# Patient Record
Sex: FEMALE | Race: WHITE | NOT HISPANIC OR LATINO | Employment: FULL TIME | URBAN - METROPOLITAN AREA
[De-identification: names, ages, dates, MRNs, and addresses within clinical notes are randomized per-mention and may not be internally consistent; named-entity substitution may affect disease eponyms.]

---
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
## 2018-02-01 ENCOUNTER — OFFICE VISIT (OUTPATIENT)
Dept: OBGYN | Facility: CLINIC | Age: 18
End: 2018-02-01
Payer: COMMERCIAL

## 2018-02-01 VITALS
HEIGHT: 64 IN | WEIGHT: 117 LBS | BODY MASS INDEX: 19.97 KG/M2 | SYSTOLIC BLOOD PRESSURE: 100 MMHG | DIASTOLIC BLOOD PRESSURE: 62 MMHG

## 2018-02-01 DIAGNOSIS — N39.0 RECURRENT UTI: ICD-10-CM

## 2018-02-01 DIAGNOSIS — R30.0 DYSURIA: Primary | ICD-10-CM

## 2018-02-01 DIAGNOSIS — K62.5 FRESH BLOOD PASSED PER RECTUM: ICD-10-CM

## 2018-02-01 LAB
ALBUMIN UR-MCNC: NEGATIVE MG/DL
APPEARANCE UR: CLEAR
BILIRUB UR QL STRIP: NEGATIVE
COLOR UR AUTO: YELLOW
GLUCOSE UR STRIP-MCNC: NEGATIVE MG/DL
HGB UR QL STRIP: ABNORMAL
KETONES UR STRIP-MCNC: NEGATIVE MG/DL
LEUKOCYTE ESTERASE UR QL STRIP: ABNORMAL
NITRATE UR QL: NEGATIVE
PH UR STRIP: 5.5 PH (ref 5–7)
SOURCE: ABNORMAL
SP GR UR STRIP: >1.03 (ref 1–1.03)
UROBILINOGEN UR STRIP-ACNC: 0.2 EU/DL (ref 0.2–1)

## 2018-02-01 PROCEDURE — 81003 URINALYSIS AUTO W/O SCOPE: CPT | Performed by: NURSE PRACTITIONER

## 2018-02-01 PROCEDURE — 99213 OFFICE O/P EST LOW 20 MIN: CPT | Performed by: NURSE PRACTITIONER

## 2018-02-01 PROCEDURE — 87086 URINE CULTURE/COLONY COUNT: CPT | Performed by: NURSE PRACTITIONER

## 2018-02-01 RX ORDER — NITROFURANTOIN 25; 75 MG/1; MG/1
100 CAPSULE ORAL 2 TIMES DAILY
Qty: 14 CAPSULE | Refills: 0 | Status: SHIPPED | OUTPATIENT
Start: 2018-02-01 | End: 2018-04-27

## 2018-02-01 NOTE — PROGRESS NOTES
SUBJECTIVE:                                                   Mina Chavarria is a 17 year old female who presents to clinic today for the following health issue(s):  Patient presents with:  Urinary Problem: symptoms of urgency, frequency and pain x 1 week, started cranberry pills helped initally, has previous h/o UTI's.    Additional information: has noticed blood in stool, thinks she may have a hemorrhoid.        HPI:  Pt here today with concerns about a possible UTI. She has some urgency and frequency and some pain x1 week. She takes Azo cranberry tablets when she feels symptoms starting.     She also has concerns about some rectal bleeding. She hasn't noticed blood in the stool, but pain with BM and some blood on the tissue after wiping. The blood is bright red. She denies constipation, she moves her bowels daily. She states she does not strain with BM. No anal sexual activity.    No LMP recorded. Patient is not currently having periods (Reason: IUD)..   Patient is sexually active, .  Using IUD for contraception.    reports that she has been smoking.  She has never used smokeless tobacco.  STD testing offered?  recently tested 17  Health maintenance updated:  yes    Today's PHQ-2 Score:   PHQ-2 (  [employer]) 3/20/2017   Q1: Little interest or pleasure in doing things 0   Q2: Feeling down, depressed or hopeless 0   PHQ-2 Score 0     Today's PHQ-9 Score:   PHQ-9 SCORE 2017   Total Score -   Total Score 2     Today's JAMILA-7 Score:   JAMILA-7 SCORE 2017   Total Score -   Total Score 0       Problem list and histories reviewed & adjusted, as indicated.  Additional history: as documented.    Patient Active Problem List   Diagnosis     Herpes simplex     Major depressive disorder, recurrent episode, severe (H)     Anxiety disorder     IUD check up     Constipation     Recurrent UTI     Attention deficit disorder (ADD) without hyperactivity     Past Surgical History:   Procedure Laterality Date      "HC EXC BENIGN SKIN LESION FACE/EARS <=0.5 CM  infant      Social History   Substance Use Topics     Smoking status: Current Some Day Smoker     Smokeless tobacco: Never Used      Comment: 3 cigarettes per week/trying to quit. Has been about 2 weeks since last cigarette.     Alcohol use 0.0 oz/week     0 Standard drinks or equivalent per week      Comment:  6/27/12   8/13/12       Problem (# of Occurrences) Relation (Name,Age of Onset)    CANCER (1) Maternal Grandmother: rectal cancer    Gynecology (1) Mother: endometrial hyperplasia, s/p hyterectomy            Current Outpatient Prescriptions   Medication Sig     nitroFURantoin, macrocrystal-monohydrate, (MACROBID) 100 MG capsule Take 1 capsule (100 mg) by mouth 2 times daily     valACYclovir (VALTREX) 500 MG tablet Take 1 tablet (500 mg) by mouth daily . Double up during an outbreak     gabapentin (NEURONTIN) 300 MG capsule Take 300 mg by mouth     buPROPion (WELLBUTRIN XL) 300 MG 24 hr tablet Take 1 tablet (300 mg) by mouth every morning     citalopram (CELEXA) 40 MG tablet Take 1 tablet (40 mg) by mouth daily     levonorgestrel (MIRENA) 20 MCG/24HR IUD 1 each (20 mcg) by Intrauterine route once for 1 dose     No current facility-administered medications for this visit.      Allergies   Allergen Reactions     Hydrocodone Nausea and Vomiting     Oxycodone Nausea and Vomiting       ROS:  12 point review of systems negative other than symptoms noted below.  Respiratory: Cough  Gastrointestinal: Blood in Stools  Genitourinary: Painful Urination and Urgency    OBJECTIVE:     /62  Ht 5' 3.5\" (1.613 m)  Wt 117 lb (53.1 kg)  BMI 20.4 kg/m2  Body mass index is 20.4 kg/(m^2).    Exam:  Constitutional:  Appearance: Well nourished, well developed alert, in no acute distress  Neurologic/Psychiatric:  Mental Status:  Oriented X3   Pelvic Exam:  External Genitalia:     Normal appearance for age, no discharge present, no tenderness present, no inflammatory lesions " present, color normal  Anus:     Anus within normal limits, no hemorrhoids present  Rectal:    No blood noticed on rectal exam. Pt noted some pain  Inguinal Lymph Nodes:     No lymphadenopathy present  Pubic Hair:     Normal pubic hair distribution for age  Genitalia and Groin:     No rashes present, no lesions present, no areas of discoloration, no masses present       In-Clinic Test Results:  Results for orders placed or performed in visit on 02/01/18 (from the past 24 hour(s))   UA without Microscopic   Result Value Ref Range    Color Urine Yellow     Appearance Urine Clear     Glucose Urine Negative NEG^Negative mg/dL    Bilirubin Urine Negative NEG^Negative    Ketones Urine Negative NEG^Negative mg/dL    Specific Gravity Urine >1.030 1.003 - 1.035    Blood Urine 1+ (A) NEG^Negative    pH Urine 5.5 5.0 - 7.0 pH    Protein Albumin Urine Negative NEG^Negative mg/dL    Urobilinogen Urine 0.2 0.2 - 1.0 EU/dL    Nitrite Urine Negative NEG^Negative    Leukocyte Esterase Urine 1+ (A) NEG^Negative    Source Midstream Urine        ASSESSMENT/PLAN:                                                        ICD-10-CM    1. Dysuria R30.0 UA without Microscopic     UA without Microscopic     nitroFURantoin, macrocrystal-monohydrate, (MACROBID) 100 MG capsule     Urine Culture Aerobic Bacterial     CANCELED: UA without Microscopic   2. Fresh blood passed per rectum K62.5    3. Recurrent UTI N39.0        There are no Patient Instructions on file for this visit.    UA +, will send uc. Will treat today. Encouraged Cranberry and D-Mannose daily, regardless of symptoms. Voiding after IC. Will monitor rectal bleeding. If persistent we will have her see GI.    MIGUEL ANGEL Oneal [facility] FOR [facility]

## 2018-02-01 NOTE — MR AVS SNAPSHOT
"              After Visit Summary   2018    Mina Chavarria    MRN: [id number]           Patient Information     Date Of Birth          2000        Visit Information        Provider Department      2018 1:30 PM Brenna Jenkins APRN CNP [facility]        Today's Diagnoses     Dysuria    -  1    Fresh blood passed per rectum        Recurrent UTI           Follow-ups after your visit        Follow-up notes from your care team     Return in about 1 year (around 2019).      Who to contact     If you have questions or need follow up information about today's clinic visit or your schedule please contact [facility] directly at [phone].  Normal or non-critical lab and imaging results will be communicated to you by MyChart, letter or phone within 4 business days after the clinic has received the results. If you do not hear from us within 7 days, please contact the clinic through [employer] or phone. If you have a critical or abnormal lab result, we will notify you by phone as soon as possible.  Submit refill requests through [employer] or call your pharmacy and they will forward the refill request to us. Please allow 3 business days for your refill to be completed.          Additional Information About Your Visit        MyChart Information     [employer] lets you send messages to your doctor, view your test results, renew your prescriptions, schedule appointments and more. To sign up, go to [email or web address] . Click on \"Log in\" on the left side of the screen, which will take you to the Welcome page. Then click on \"Sign up Now\" on the right side of the page.     You will be asked to enter the access code listed below, as well as some personal information. Please follow the directions to create your username and password.     Your access code is: [email or web address]  Expires: 2018  9:55 AM     Your access code will  in 90 days. If you need help or a new code, " "please call your [place] clinic or [phone].        Care EveryWhere ID     This is your Care EveryWhere ID. This could be used by other organizations to access your [place] medical records  [id number]        Your Vitals Were     Height BMI (Body Mass Index)                5' 3.5\" (1.613 m) 20.4 kg/m2           Blood Pressure from Last 3 Encounters:   02/01/18 100/62   12/27/17 100/70   12/09/17 110/70    Weight from Last 3 Encounters:   02/01/18 117 lb (53.1 kg) (28 %)*   12/27/17 114 lb (51.7 kg) (22 %)*   12/09/17 115 lb (52.2 kg) (24 %)*     * Growth percentiles are based on [facility] 2-20 Years data.              We Performed the Following     UA without Microscopic     Urine Culture Aerobic Bacterial          Today's Medication Changes          These changes are accurate as of 2/1/18  2:08 PM.  If you have any questions, ask your nurse or doctor.               Start taking these medicines.        Dose/Directions    nitroFURantoin (macrocrystal-monohydrate) 100 MG capsule   Commonly known as:  MACROBID   Used for:  Dysuria   Started by:  Brenna Jenkins APRN CNP        Dose:  100 mg   Take 1 capsule (100 mg) by mouth 2 times daily   Quantity:  14 capsule   Refills:  0            Where to get your medicines      These medications were sent to [facility]/pharmacy #[id number] [place] [address] [address] [place] [id number]     Phone:  [phone]     nitroFURantoin (macrocrystal-monohydrate) 100 MG capsule                Primary Care Provider Office Phone # Fax #    Amanda Arce [phone] [address] [place] [id number]        Equal Access to Services     JANNETH ZURITA : Jalil Guerrero, lorene devries, zenia kumar, leslie kyle. So [facility] [phone].    ATENCIÓN: Si habla español, tiene a hunt disposición servicios gratuitos de asistencia lingüística. Llame al [phone].    We " comply with applicable federal civil rights laws and [place] laws. We do not discriminate on the basis of race, color, national origin, age, disability, sex, sexual orientation, or gender identity.            Thank you!     Thank you for choosing [facility]  for your care. Our goal is always to provide you with excellent care. Hearing back from our patients is one way we can continue to improve our services. Please take a few minutes to complete the written survey that you may receive in the mail after your visit with us. Thank you!             Your Updated Medication List - Protect others around you: Learn how to safely use, store and throw away your medicines at www.disposemymeds.org.          This list is accurate as of 2/1/18  2:08 PM.  Always use your most recent med list.                   Brand Name Dispense Instructions for use Diagnosis    buPROPion 300 MG 24 hr tablet    WELLBUTRIN XL    30 tablet    Take 1 tablet (300 mg) by mouth every morning    Major depressive disorder, recurrent episode, severe, without mention of psychotic behavior, Anxiety disorder       citalopram 40 MG tablet    celeXA    90 tablet    Take 1 tablet (40 mg) by mouth daily    Anxiety disorder, Major depressive disorder, recurrent episode, severe, without mention of psychotic behavior       gabapentin 300 MG capsule    NEURONTIN     Take 300 mg by mouth        levonorgestrel 20 MCG/24HR IUD    MIRENA    1 each    1 each (20 mcg) by Intrauterine route once for 1 dose    Contraception, Vaginal spotting       nitroFURantoin (macrocrystal-monohydrate) 100 MG capsule    MACROBID    14 capsule    Take 1 capsule (100 mg) by mouth 2 times daily    Dysuria       valACYclovir 500 MG tablet    VALTREX    180 tablet    Take 1 tablet (500 mg) by mouth daily . Double up during an outbreak    HSV-1 infection

## 2018-02-02 LAB
BACTERIA SPEC CULT: NO GROWTH
Lab: NORMAL
SPECIMEN SOURCE: NORMAL

## 2018-03-27 ENCOUNTER — OFFICE VISIT (OUTPATIENT)
Dept: OBGYN | Facility: CLINIC | Age: 18
End: 2018-03-27
Payer: COMMERCIAL

## 2018-03-27 VITALS
DIASTOLIC BLOOD PRESSURE: 68 MMHG | SYSTOLIC BLOOD PRESSURE: 104 MMHG | BODY MASS INDEX: 21.44 KG/M2 | HEIGHT: 63 IN | WEIGHT: 121 LBS | HEART RATE: 76 BPM

## 2018-03-27 DIAGNOSIS — R59.9 ENLARGED LYMPH NODE: Primary | ICD-10-CM

## 2018-03-27 LAB
BASOPHILS # BLD AUTO: 0.1 10E9/L (ref 0–0.2)
BASOPHILS NFR BLD AUTO: 1 %
DIFFERENTIAL METHOD BLD: NORMAL
EOSINOPHIL # BLD AUTO: 0.1 10E9/L (ref 0–0.7)
EOSINOPHIL NFR BLD AUTO: 1.7 %
ERYTHROCYTE [DISTWIDTH] IN BLOOD BY AUTOMATED COUNT: 11.2 % (ref 10–15)
HCT VFR BLD AUTO: 39.6 % (ref 35–47)
HGB BLD-MCNC: 13 G/DL (ref 11.7–15.7)
LYMPHOCYTES # BLD AUTO: 2 10E9/L (ref 0.8–5.3)
LYMPHOCYTES NFR BLD AUTO: 40.8 %
MCH RBC QN AUTO: 31.9 PG (ref 26.5–33)
MCHC RBC AUTO-ENTMCNC: 32.8 G/DL (ref 31.5–36.5)
MCV RBC AUTO: 97 FL (ref 78–100)
MONOCYTES # BLD AUTO: 0.5 10E9/L (ref 0–1.3)
MONOCYTES NFR BLD AUTO: 10.3 %
NEUTROPHILS # BLD AUTO: 2.2 10E9/L (ref 1.6–8.3)
NEUTROPHILS NFR BLD AUTO: 46.2 %
PLATELET # BLD AUTO: 301 10E9/L (ref 150–450)
RBC # BLD AUTO: 4.07 10E12/L (ref 3.8–5.2)
WBC # BLD AUTO: 4.8 10E9/L (ref 4–11)

## 2018-03-27 PROCEDURE — 85025 COMPLETE CBC W/AUTO DIFF WBC: CPT | Performed by: NURSE PRACTITIONER

## 2018-03-27 PROCEDURE — 99212 OFFICE O/P EST SF 10 MIN: CPT | Performed by: NURSE PRACTITIONER

## 2018-03-27 PROCEDURE — 36415 COLL VENOUS BLD VENIPUNCTURE: CPT | Performed by: NURSE PRACTITIONER

## 2018-03-27 NOTE — MR AVS SNAPSHOT
"              After Visit Summary   3/27/2018    Mina Chavarria    MRN: [id number]           Patient Information     Date Of Birth          2000        Visit Information        Provider Department      3/27/2018 1:00 PM Brenna Jenkins APRN CNP [facility]        Today's Diagnoses     Enlarged lymph node    -  1       Follow-ups after your visit        Who to contact     If you have questions or need follow up information about today's clinic visit or your schedule please contact [facility] directly at [phone].  Normal or non-critical lab and imaging results will be communicated to you by [employer], letter or phone within 4 business days after the clinic has received the results. If you do not hear from us within 7 days, please contact the clinic through [employer] or phone. If you have a critical or abnormal lab result, we will notify you by phone as soon as possible.  Submit refill requests through [employer] or call your pharmacy and they will forward the refill request to us. Please allow 3 business days for your refill to be completed.          Additional Information About Your Visit        MyChart Information     [employer] lets you send messages to your doctor, view your test results, renew your prescriptions, schedule appointments and more. To sign up, go to [email or web address] . Click on \"Log in\" on the left side of the screen, which will take you to the Welcome page. Then click on \"Sign up Now\" on the right side of the page.     You will be asked to enter the access code listed below, as well as some personal information. Please follow the directions to create your username and password.     Your access code is: [email or web address]  Expires: 2018  1:24 PM     Your access code will  in 90 days. If you need help or a new code, please call your [place] clinic or [phone].        Care EveryWhere ID     This is your Care EveryWhere ID. This could be used by " "other organizations to access your [place] medical records  [id number]        Your Vitals Were     Pulse Height BMI (Body Mass Index)             76 5' 3\" (1.6 m) 21.43 kg/m2          Blood Pressure from Last 3 Encounters:   03/27/18 104/68   02/01/18 100/62   12/27/17 100/70    Weight from Last 3 Encounters:   03/27/18 121 lb (54.9 kg)   02/01/18 117 lb (53.1 kg) (28 %)*   12/27/17 114 lb (51.7 kg) (22 %)*     * Growth percentiles are based on [facility] 2-20 Years data.              We Performed the Following     CBC with platelets and differential        Primary Care Provider Office Phone # Fax #    Amanda Arce [phone]       [address] [facility] [address]        Equal Access to Services     [facility]: Hadii jackie loerao Somario, waaxda luqadaha, qaybta kaalmada adegraysonyamaya, leslie matthews . So [facility] [phone].    ATENCIÓN: Si habla español, tiene a hunt disposición servicios gratuitos de asistencia lingüística. Nahumame al [phone].    We comply with applicable federal civil rights laws and [place] laws. We do not discriminate on the basis of race, color, national origin, age, disability, sex, sexual orientation, or gender identity.            Thank you!     Thank you for choosing [facility]  for your care. Our goal is always to provide you with excellent care. Hearing back from our patients is one way we can continue to improve our services. Please take a few minutes to complete the written survey that you may receive in the mail after your visit with us. Thank you!             Your Updated Medication List - Protect others around you: Learn how to safely use, store and throw away your medicines at www.disposemymeds.org.          This list is accurate as of 3/27/18  1:24 PM.  Always use your most recent med list.                   Brand Name Dispense Instructions for use Diagnosis    buPROPion 300 MG 24 hr tablet    " WELLBUTRIN XL    30 tablet    Take 1 tablet (300 mg) by mouth every morning    Major depressive disorder, recurrent episode, severe, without mention of psychotic behavior, Anxiety disorder       citalopram 40 MG tablet    celeXA    90 tablet    Take 1 tablet (40 mg) by mouth daily    Anxiety disorder, Major depressive disorder, recurrent episode, severe, without mention of psychotic behavior       gabapentin 300 MG capsule    NEURONTIN     Take 300 mg by mouth        levonorgestrel 20 MCG/24HR IUD    MIRENA    1 each    1 each (20 mcg) by Intrauterine route once for 1 dose    Contraception, Vaginal spotting       nitroFURantoin (macrocrystal-monohydrate) 100 MG capsule    MACROBID    14 capsule    Take 1 capsule (100 mg) by mouth 2 times daily    Dysuria       valACYclovir 500 MG tablet    VALTREX    180 tablet    Take 1 tablet (500 mg) by mouth daily . Double up during an outbreak    HSV-1 infection

## 2018-03-27 NOTE — PROGRESS NOTES
SUBJECTIVE:                                                   Mina Chavarria is a 18 year old female who presents to clinic today for the following health issue(s):  Patient presents with:  Physical: bump on groin       HPI:  Pt here today with c/o a lump on her right groin. It is tender, no red or draining anything. She reports feeling well. No fever/chills. She hasn't been around anyone who's sick. No pelvic symptoms, no discharge/odor/itching. No new sexual partners. STD testing was negative in December.     No LMP recorded. Patient is not currently having periods (Reason: IUD)..   Patient is sexually active, .  Using IUD for contraception.    reports that she has been smoking.  She has never used smokeless tobacco.    STD testing offered?  Declined    Health maintenance updated:  yes    Today's PHQ-2 Score:   PHQ-2 (  [employer]) 3/20/2017   Q1: Little interest or pleasure in doing things 0   Q2: Feeling down, depressed or hopeless 0   PHQ-2 Score 0     Today's PHQ-9 Score:   PHQ-9 SCORE 2017   Total Score -   Total Score 2     Today's JAMILA-7 Score:   JAMILA-7 SCORE 2017   Total Score -   Total Score 0       Problem list and histories reviewed & adjusted, as indicated.  Additional history: as documented.    Patient Active Problem List   Diagnosis     Herpes simplex     Major depressive disorder, recurrent episode, severe (H)     Anxiety disorder     IUD check up     Constipation     Recurrent UTI     Attention deficit disorder (ADD) without hyperactivity     Past Surgical History:   Procedure Laterality Date     HC EXC BENIGN SKIN LESION FACE/EARS <=0.5 CM  infant      Social History   Substance Use Topics     Smoking status: Current Some Day Smoker     Smokeless tobacco: Never Used      Comment: 3 cigarettes per week/trying to quit. Has been about 2 weeks since last cigarette.     Alcohol use 0.0 oz/week     0 Standard drinks or equivalent per week      Comment: hf 12  hf 12        "Problem (# of Occurrences) Relation (Name,Age of Onset)    CANCER (1) Maternal Grandmother: rectal cancer    Gynecology (1) Mother: endometrial hyperplasia, s/p hyterectomy            Current Outpatient Prescriptions   Medication Sig     nitroFURantoin, macrocrystal-monohydrate, (MACROBID) 100 MG capsule Take 1 capsule (100 mg) by mouth 2 times daily     valACYclovir (VALTREX) 500 MG tablet Take 1 tablet (500 mg) by mouth daily . Double up during an outbreak     gabapentin (NEURONTIN) 300 MG capsule Take 300 mg by mouth     buPROPion (WELLBUTRIN XL) 300 MG 24 hr tablet Take 1 tablet (300 mg) by mouth every morning     citalopram (CELEXA) 40 MG tablet Take 1 tablet (40 mg) by mouth daily     levonorgestrel (MIRENA) 20 MCG/24HR IUD 1 each (20 mcg) by Intrauterine route once for 1 dose     No current facility-administered medications for this visit.      Allergies   Allergen Reactions     Hydrocodone Nausea and Vomiting     Oxycodone Nausea and Vomiting       ROS:  12 point review of systems negative other than symptoms noted below.    OBJECTIVE:     /68  Pulse 76  Ht 5' 3\" (1.6 m)  Wt 121 lb (54.9 kg)  BMI 21.43 kg/m2  Body mass index is 21.43 kg/(m^2).    Exam:  Constitutional:  Appearance: Well nourished, well developed alert, in no acute distress  Neurologic/Psychiatric:  Mental Status:  Oriented X3   Pelvic Exam:  External Genitalia:     Normal appearance for age, no discharge present, no tenderness present, no inflammatory lesions present, color normal  Inguinal Lymph Nodes:     Singular, right superficial inguinal lymph node enlarged. Slightly tender  Pubic Hair:     Normal pubic hair distribution for age  Genitalia and Groin:     No rashes present, no lesions present, no areas of discoloration, no masses present       In-Clinic Test Results:  No results found for this or any previous visit (from the past 24 hour(s)).    ASSESSMENT/PLAN:                                                        ICD-10-CM  "   1. Enlarged lymph node R59.9 CBC with platelets and differential       There are no Patient Instructions on file for this visit.    Will check CBC today. If lymph continues to enlarge RTC for imaging. RTC 2 weeks    MIGUEL ANGEL Oneal CNP  [facility]

## 2018-04-27 ENCOUNTER — OFFICE VISIT (OUTPATIENT)
Dept: OBGYN | Facility: CLINIC | Age: 18
End: 2018-04-27
Payer: COMMERCIAL

## 2018-04-27 VITALS
SYSTOLIC BLOOD PRESSURE: 112 MMHG | HEIGHT: 63 IN | DIASTOLIC BLOOD PRESSURE: 60 MMHG | HEART RATE: 68 BPM | WEIGHT: 117 LBS | BODY MASS INDEX: 20.73 KG/M2

## 2018-04-27 DIAGNOSIS — Z11.3 SCREEN FOR STD (SEXUALLY TRANSMITTED DISEASE): ICD-10-CM

## 2018-04-27 DIAGNOSIS — N89.8 VAGINAL DISCHARGE: ICD-10-CM

## 2018-04-27 DIAGNOSIS — Z11.8 SCREENING FOR CHLAMYDIAL DISEASE: Primary | ICD-10-CM

## 2018-04-27 DIAGNOSIS — B96.89 BV (BACTERIAL VAGINOSIS): ICD-10-CM

## 2018-04-27 DIAGNOSIS — N76.0 BV (BACTERIAL VAGINOSIS): ICD-10-CM

## 2018-04-27 LAB
SPECIMEN SOURCE: ABNORMAL
WET PREP SPEC: ABNORMAL

## 2018-04-27 PROCEDURE — 86780 TREPONEMA PALLIDUM: CPT | Performed by: NURSE PRACTITIONER

## 2018-04-27 PROCEDURE — 87491 CHLMYD TRACH DNA AMP PROBE: CPT | Performed by: NURSE PRACTITIONER

## 2018-04-27 PROCEDURE — 86695 HERPES SIMPLEX TYPE 1 TEST: CPT | Performed by: NURSE PRACTITIONER

## 2018-04-27 PROCEDURE — 87210 SMEAR WET MOUNT SALINE/INK: CPT | Performed by: NURSE PRACTITIONER

## 2018-04-27 PROCEDURE — 99213 OFFICE O/P EST LOW 20 MIN: CPT | Performed by: NURSE PRACTITIONER

## 2018-04-27 PROCEDURE — 86696 HERPES SIMPLEX TYPE 2 TEST: CPT | Performed by: NURSE PRACTITIONER

## 2018-04-27 PROCEDURE — 87389 HIV-1 AG W/HIV-1&-2 AB AG IA: CPT | Performed by: NURSE PRACTITIONER

## 2018-04-27 PROCEDURE — 87591 N.GONORRHOEAE DNA AMP PROB: CPT | Performed by: NURSE PRACTITIONER

## 2018-04-27 PROCEDURE — 36415 COLL VENOUS BLD VENIPUNCTURE: CPT | Performed by: NURSE PRACTITIONER

## 2018-04-27 RX ORDER — METRONIDAZOLE 500 MG/1
500 TABLET ORAL 2 TIMES DAILY
Qty: 14 TABLET | Refills: 0 | Status: SHIPPED | OUTPATIENT
Start: 2018-04-27 | End: 2019-01-08

## 2018-04-27 NOTE — PROGRESS NOTES
SUBJECTIVE:                                                   Mina Chavarria is a 18 year old female who presents to clinic today for the following health issue(s):  Patient presents with:  Vaginal Problem: vaginal discharge, odor that started 2 weeks ago.        HPI:  Pt here today with concern about a vaginal discharge that started 2 weeks ago. +odor. Itching only on one spot externally that started yesterday.     No new sexual partners, detergents, soaps.    Pt has a Mirena IUD in place. Strings not visible. Last position confirmed via ultrasound 2017.  Mirena was placed 2014, due for exchange 2019.    She requests all std testing.     No LMP recorded. Patient is not currently having periods (Reason: IUD)..   Patient is sexually active, .  Using IUD and condoms for contraception.    reports that she has quit smoking. She has never used smokeless tobacco.    STD testing offered?  Accepted    Health maintenance updated:  yes    Today's PHQ-2 Score:   PHQ-2 (  [employer]) 3/20/2017   Q1: Little interest or pleasure in doing things 0   Q2: Feeling down, depressed or hopeless 0   PHQ-2 Score 0     Today's PHQ-9 Score:   PHQ-9 SCORE 2017   Total Score -   Total Score 2     Today's JAMILA-7 Score:   JAMILA-7 SCORE 2017   Total Score -   Total Score 0       Problem list and histories reviewed & adjusted, as indicated.  Additional history: as documented.    Patient Active Problem List   Diagnosis     Herpes simplex     Major depressive disorder, recurrent episode, severe (H)     Anxiety disorder     IUD check up     Constipation     Recurrent UTI     Attention deficit disorder (ADD) without hyperactivity     Past Surgical History:   Procedure Laterality Date     HC EXC BENIGN SKIN LESION FACE/EARS <=0.5 CM  infant      Social History   Substance Use Topics     Smoking status: Former Smoker     Smokeless tobacco: Never Used      Comment: 3 cigarettes per week/trying to quit. Has been about 2  "weeks since last cigarette.     Alcohol use 0.0 oz/week     0 Standard drinks or equivalent per week      Comment:  6/27/12  hf 8/13/12       Problem (# of Occurrences) Relation (Name,Age of Onset)    CANCER (1) Maternal Grandmother: rectal cancer    Gynecology (1) Mother: endometrial hyperplasia, s/p hyterectomy            Current Outpatient Prescriptions   Medication Sig     buPROPion (WELLBUTRIN XL) 300 MG 24 hr tablet Take 1 tablet (300 mg) by mouth every morning     citalopram (CELEXA) 40 MG tablet Take 1 tablet (40 mg) by mouth daily     gabapentin (NEURONTIN) 300 MG capsule Take 300 mg by mouth     levonorgestrel (MIRENA) 20 MCG/24HR IUD 1 each (20 mcg) by Intrauterine route once for 1 dose     metroNIDAZOLE (FLAGYL) 500 MG tablet Take 1 tablet (500 mg) by mouth 2 times daily     valACYclovir (VALTREX) 500 MG tablet Take 1 tablet (500 mg) by mouth daily . Double up during an outbreak     No current facility-administered medications for this visit.      Allergies   Allergen Reactions     Hydrocodone Nausea and Vomiting     Oxycodone Nausea and Vomiting       ROS:  12 point review of systems negative other than symptoms noted below.  Genitourinary: Vaginal Discharge and Vaginal Itching    OBJECTIVE:     /60  Pulse 68  Ht 5' 3\" (1.6 m)  Wt 117 lb (53.1 kg)  BMI 20.73 kg/m2  Body mass index is 20.73 kg/(m^2).    Exam:  Constitutional:  Appearance: Well nourished, well developed alert, in no acute distress  Neurologic/Psychiatric:  Mental Status:  Oriented X3   Pelvic Exam:  External Genitalia:     Normal appearance for age, no discharge present, no tenderness present, no inflammatory lesions present, color normal  Vagina:     Normal vaginal vault without central or paravaginal defects, COPIOUS FROTHY CREAMY +ODOR discharge present, no inflammatory lesions present, no masses present  Bladder:     Nontender to palpation  Urethra:   Urethral Body:  Urethra palpation normal, urethra structural support " normal   Urethral Meatus:  No erythema or lesions present  Cervix:     Appearance healthy, no lesions present, nontender to palpation, no bleeding present-IUD STRINGS NOT VISUALIZED  Uterus:     Uterus: firm, normal sized and nontender, anteverted in position.   Adnexa:     No adnexal tenderness present, no adnexal masses present  Perineum:     Perineum within normal limits, no evidence of trauma, no rashes or skin lesions present  Anus:     Anus within normal limits, no hemorrhoids present  Inguinal Lymph Nodes:     No lymphadenopathy present  Pubic Hair:     Normal pubic hair distribution for age  Genitalia and Groin:     No rashes present, no lesions present, no areas of discoloration, no masses present       In-Clinic Test Results:  Results for orders placed or performed in visit on 04/27/18 (from the past 24 hour(s))   Wet prep   Result Value Ref Range    Specimen Description Vagina     Wet Prep No Trichomonas seen     Wet Prep Clue cells seen (A)     Wet Prep No yeast seen        ASSESSMENT/PLAN:                                                        ICD-10-CM    1. Screening for chlamydial disease Z11.8 CHLAMYDIA TRACHOMATIS PCR   2. Screen for STD (sexually transmitted disease) Z11.3 NEISSERIA GONORRHOEA PCR     Anti Treponema     Herpes Simplex Virus 1 and 2 IgG     HIV Antigen Antibody Combo   3. Vaginal discharge N89.8 Wet prep   4. BV (bacterial vaginosis) N76.0 metroNIDAZOLE (FLAGYL) 500 MG tablet    B96.89        There are no Patient Instructions on file for this visit.    Wet prep + BV. Will treat today with oral flagyl. Encouraged sitz baths. Will update with STD testing next week.     MIGUEL ANGEL Oneal [facility]

## 2018-04-27 NOTE — MR AVS SNAPSHOT
"              After Visit Summary   2018    Mina Chavarria    MRN: [id number]           Patient Information     Date Of Birth          2000        Visit Information        Provider Department      2018 2:00 PM Brenna Jenkins APRN CNP [facility]        Today's Diagnoses     Screening for chlamydial disease    -  1    Screen for STD (sexually transmitted disease)        Vaginal discharge        BV (bacterial vaginosis)           Follow-ups after your visit        Who to contact     If you have questions or need follow up information about today's clinic visit or your schedule please contact [facility] directly at [phone].  Normal or non-critical lab and imaging results will be communicated to you by MyChart, letter or phone within 4 business days after the clinic has received the results. If you do not hear from us within 7 days, please contact the clinic through MyChart or phone. If you have a critical or abnormal lab result, we will notify you by phone as soon as possible.  Submit refill requests through Chance (app) or call your pharmacy and they will forward the refill request to us. Please allow 3 business days for your refill to be completed.          Additional Information About Your Visit        MyChart Information     Chance (app) lets you send messages to your doctor, view your test results, renew your prescriptions, schedule appointments and more. To sign up, go to [email or web address] ([email or web address]) . Click on \"Log in\" on the left side of the screen, which will take you to the Welcome page. Then click on \"Sign up Now\" on the right side of the page.     You will be asked to enter the access code listed below, as well as some personal information. Please follow the directions to create your username and password.     Your access code is: [email or web address]  Expires: 2018  1:24 PM     Your access code will  in 90 days. If you need help or a new code, please call " "your [place] clinic or [phone].        Care EveryWhere ID     This is your Care EveryWhere ID. This could be used by other organizations to access your [place] medical records  [id number]        Your Vitals Were     Pulse Height BMI (Body Mass Index)             68 5' 3\" (1.6 m) 20.73 kg/m2          Blood Pressure from Last 3 Encounters:   04/27/18 112/60   03/27/18 104/68   02/01/18 100/62    Weight from Last 3 Encounters:   04/27/18 117 lb (53.1 kg)   03/27/18 121 lb (54.9 kg)   02/01/18 117 lb (53.1 kg) (28 %)*     * Growth percentiles are based on CDC 2-20 Years data.              We Performed the Following     Anti Treponema     CHLAMYDIA TRACHOMATIS PCR     Herpes Simplex Virus 1 and 2 IgG     HIV Antigen Antibody Combo     NEISSERIA GONORRHOEA PCR     Wet prep          Today's Medication Changes          These changes are accurate as of 4/27/18  2:19 PM.  If you have any questions, ask your nurse or doctor.               Start taking these medicines.        Dose/Directions    metroNIDAZOLE 500 MG tablet   Commonly known as:  FLAGYL   Used for:  BV (bacterial vaginosis)   Started by:  Brenna Jenkins APRN CNP        Dose:  500 mg   Take 1 tablet (500 mg) by mouth 2 times daily   Quantity:  14 tablet   Refills:  0            Where to get your medicines      These medications were sent to [facility]/pharmacy #[id number] [place] [id number]     Phone:  [phone]     metroNIDAZOLE 500 MG tablet                Primary Care Provider Office Phone # Fax #    Amanda Arce [phone]       [address] [place] [address]        Equal Access to Services     JANNETH ZURITA AH: Jalil Guerrero, lorene devries, zenia kumar, leslie kyle. So [facility] [phone].    ATENCIÓN: Si habla español, tiene a hunt disposición servicios gratuitos de asistencia lingüística. Llame al " [phone].    We comply with applicable federal civil rights laws and [place] laws. We do not discriminate on the basis of race, color, national origin, age, disability, sex, sexual orientation, or gender identity.            Thank you!     Thank you for choosing [facility]  for your care. Our goal is always to provide you with excellent care. Hearing back from our patients is one way we can continue to improve our services. Please take a few minutes to complete the written survey that you may receive in the mail after your visit with us. Thank you!             Your Updated Medication List - Protect others around you: Learn how to safely use, store and throw away your medicines at www.disposemymeds.org.          This list is accurate as of 4/27/18  2:19 PM.  Always use your most recent med list.                   Brand Name Dispense Instructions for use Diagnosis    buPROPion 300 MG 24 hr tablet    WELLBUTRIN XL    30 tablet    Take 1 tablet (300 mg) by mouth every morning    Major depressive disorder, recurrent episode, severe, without mention of psychotic behavior, Anxiety disorder       citalopram 40 MG tablet    celeXA    90 tablet    Take 1 tablet (40 mg) by mouth daily    Anxiety disorder, Major depressive disorder, recurrent episode, severe, without mention of psychotic behavior       gabapentin 300 MG capsule    NEURONTIN     Take 300 mg by mouth        levonorgestrel 20 MCG/24HR IUD    MIRENA    1 each    1 each (20 mcg) by Intrauterine route once for 1 dose    Contraception, Vaginal spotting       metroNIDAZOLE 500 MG tablet    FLAGYL    14 tablet    Take 1 tablet (500 mg) by mouth 2 times daily    BV (bacterial vaginosis)       valACYclovir 500 MG tablet    VALTREX    180 tablet    Take 1 tablet (500 mg) by mouth daily . Double up during an outbreak    HSV-1 infection

## 2018-04-28 LAB — T PALLIDUM IGG+IGM SER QL: NEGATIVE

## 2018-04-29 LAB
C TRACH DNA SPEC QL NAA+PROBE: NEGATIVE
N GONORRHOEA DNA SPEC QL NAA+PROBE: NEGATIVE
SPECIMEN SOURCE: NORMAL
SPECIMEN SOURCE: NORMAL

## 2018-04-30 LAB
HIV 1+2 AB+HIV1 P24 AG SERPL QL IA: NONREACTIVE
HSV1 IGG SERPL QL IA: 1.1 AI (ref 0–0.8)
HSV2 IGG SERPL QL IA: <0.2 AI (ref 0–0.8)

## 2019-01-08 ENCOUNTER — OFFICE VISIT (OUTPATIENT)
Dept: OBGYN | Facility: CLINIC | Age: 19
End: 2019-01-08
Payer: COMMERCIAL

## 2019-01-08 VITALS
HEIGHT: 63 IN | DIASTOLIC BLOOD PRESSURE: 66 MMHG | SYSTOLIC BLOOD PRESSURE: 110 MMHG | WEIGHT: 120 LBS | HEART RATE: 68 BPM | BODY MASS INDEX: 21.26 KG/M2

## 2019-01-08 DIAGNOSIS — B37.31 VAGINAL CANDIDA: ICD-10-CM

## 2019-01-08 DIAGNOSIS — Z11.3 SCREEN FOR STD (SEXUALLY TRANSMITTED DISEASE): Primary | ICD-10-CM

## 2019-01-08 DIAGNOSIS — N89.8 VAGINAL DISCHARGE: ICD-10-CM

## 2019-01-08 DIAGNOSIS — B00.9 HSV-1 INFECTION: ICD-10-CM

## 2019-01-08 DIAGNOSIS — Z11.8 SCREENING FOR CHLAMYDIAL DISEASE: ICD-10-CM

## 2019-01-08 LAB
SPECIMEN SOURCE: ABNORMAL
WET PREP SPEC: ABNORMAL

## 2019-01-08 PROCEDURE — 87210 SMEAR WET MOUNT SALINE/INK: CPT | Performed by: NURSE PRACTITIONER

## 2019-01-08 PROCEDURE — 87591 N.GONORRHOEAE DNA AMP PROB: CPT | Performed by: NURSE PRACTITIONER

## 2019-01-08 PROCEDURE — 99213 OFFICE O/P EST LOW 20 MIN: CPT | Performed by: NURSE PRACTITIONER

## 2019-01-08 PROCEDURE — 87491 CHLMYD TRACH DNA AMP PROBE: CPT | Performed by: NURSE PRACTITIONER

## 2019-01-08 RX ORDER — FLUCONAZOLE 150 MG/1
150 TABLET ORAL ONCE
Qty: 1 TABLET | Refills: 0 | Status: SHIPPED | OUTPATIENT
Start: 2019-01-08 | End: 2019-05-03

## 2019-01-08 RX ORDER — VALACYCLOVIR HYDROCHLORIDE 500 MG/1
500 TABLET, FILM COATED ORAL DAILY
Qty: 180 TABLET | Refills: 1 | Status: SHIPPED | OUTPATIENT
Start: 2019-01-08 | End: 2019-11-25

## 2019-01-08 ASSESSMENT — MIFFLIN-ST. JEOR: SCORE: 1283.45

## 2019-01-08 NOTE — PROGRESS NOTES
SUBJECTIVE:                                                   Mina Chavarria is a 18 year old female who presents to clinic today for the following health issue(s):  Patient presents with:  STD: Requesting full panel    HPI:  Pt here today requesting STD testing.     She also has noticed a vaginal odor that comes and goes as well as some itching. No pelvic pain or different discharge.     IUD in place, Strings have not been visible.    She also needs a refill of her valtrex     No LMP recorded. Patient is not currently having periods (Reason: IUD)..   Patient is sexually active, .  Using IUD & condoms for contraception.    reports that she has quit smoking. she has never used smokeless tobacco.    STD testing offered?  Accepted    Health maintenance updated:  yes    Problem list and histories reviewed & adjusted, as indicated.  Additional history: as documented.    Patient Active Problem List   Diagnosis     Herpes simplex     Major depressive disorder, recurrent episode, severe (H)     Anxiety disorder     IUD check up     Constipation     Recurrent UTI     Attention deficit disorder (ADD) without hyperactivity     Past Surgical History:   Procedure Laterality Date     HC EXC BENIGN SKIN LESION FACE/EARS <=0.5 CM  infant      Social History     Tobacco Use     Smoking status: Former Smoker     Smokeless tobacco: Never Used     Tobacco comment: 3 cigarettes per week/trying to quit. Has been about 2 weeks since last cigarette.   Substance Use Topics     Alcohol use: Yes     Alcohol/week: 0.0 oz     Comment: hf 12  hf 12       Problem (# of Occurrences) Relation (Name,Age of Onset)    Cancer (1) Maternal Grandmother: rectal cancer    Gynecology (1) Mother: endometrial hyperplasia, s/p hyterectomy            Current Outpatient Medications   Medication Sig     buPROPion (WELLBUTRIN XL) 300 MG 24 hr tablet Take 1 tablet (300 mg) by mouth every morning     citalopram (CELEXA) 40 MG tablet Take 1 tablet  "(40 mg) by mouth daily     fluconazole (DIFLUCAN) 150 MG tablet Take 1 tablet (150 mg) by mouth once for 1 dose     levonorgestrel (MIRENA) 20 MCG/24HR IUD 1 each (20 mcg) by Intrauterine route once for 1 dose     valACYclovir (VALTREX) 500 MG tablet Take 1 tablet (500 mg) by mouth daily . Double up during an outbreak     No current facility-administered medications for this visit.      Allergies   Allergen Reactions     Hydrocodone Nausea and Vomiting     Oxycodone Nausea and Vomiting       ROS:  12 point review of systems negative other than symptoms noted below.  Gastrointestinal: Nausea  Genitourinary: Night Sweats, Spotting and Vaginal Itching    OBJECTIVE:     /66   Pulse 68   Ht 1.6 m (5' 3\")   Wt 54.4 kg (120 lb)   BMI 21.26 kg/m    Body mass index is 21.26 kg/m .    Exam:  Constitutional:  Appearance: Well nourished, well developed alert, in no acute distress  Neurologic/Psychiatric:  Mental Status:  Oriented X3   Pelvic Exam:  External Genitalia:     Normal appearance for age, no discharge present, no tenderness present, no inflammatory lesions present, color normal  Vagina:    Normal vaginal vault without central or paravaginal defects, no discharge present, no inflammatory lesions present, no masses present  Bladder:     Nontender to palpation  Urethra:   Urethral Body:  Urethra palpation normal, urethra structural support normal   Urethral Meatus:  No erythema or lesions present  Cervix:     Appearance healthy, no lesions present, nontender to palpation, no bleeding present, string not present  Uterus:     Nontender to palpation, no masses present, position anteflexed, mobility: normal  Adnexa:     No adnexal tenderness present, no adnexal masses present  Perineum:     Perineum within normal limits, no evidence of trauma, no rashes or skin lesions present  Anus:     Anus within normal limits, no hemorrhoids present  Inguinal Lymph Nodes:     No lymphadenopathy present  Pubic Hair:     Normal " pubic hair distribution for age  Genitalia and Groin:     No rashes present, no lesions present, no areas of discoloration, no masses present       In-Clinic Test Results:  Results for orders placed or performed in visit on 01/08/19 (from the past 24 hour(s))   Wet prep   Result Value Ref Range    Specimen Description Vagina     Wet Prep No Trichomonas seen     Wet Prep No clue cells seen     Wet Prep Yeast seen (A)        ASSESSMENT/PLAN:                                                        ICD-10-CM    1. Screen for STD (sexually transmitted disease) Z11.3 NEISSERIA GONORRHOEA PCR   2. Screening for chlamydial disease Z11.8 CHLAMYDIA TRACHOMATIS PCR   3. HSV-1 infection B00.9 valACYclovir (VALTREX) 500 MG tablet   4. Vaginal discharge N89.8 Wet prep   5. Vaginal candida B37.3 fluconazole (DIFLUCAN) 150 MG tablet       There are no Patient Instructions on file for this visit.    STD testing done. Wet prep: + yeast. Will treat today with diflucan  Med refilled.  Encouraged bath soaks.    MIGUEL ANGEL Oneal CNP  [facility]

## 2019-01-10 ENCOUNTER — TELEPHONE (OUTPATIENT)
Dept: OBGYN | Facility: CLINIC | Age: 19
End: 2019-01-10

## 2019-01-10 DIAGNOSIS — B37.31 VAGINAL CANDIDA: ICD-10-CM

## 2019-01-10 DIAGNOSIS — N89.8 VAGINAL ODOR: Primary | ICD-10-CM

## 2019-01-10 RX ORDER — FLUCONAZOLE 150 MG/1
150 TABLET ORAL
Qty: 2 TABLET | Refills: 0 | Status: SHIPPED | OUTPATIENT
Start: 2019-01-10 | End: 2019-05-03

## 2019-01-10 RX ORDER — METRONIDAZOLE 7.5 MG/G
1 GEL VAGINAL AT BEDTIME
Qty: 70 G | Refills: 0 | Status: SHIPPED | OUTPATIENT
Start: 2019-01-10 | End: 2019-05-03

## 2019-01-10 NOTE — TELEPHONE ENCOUNTER
Pt calling that she saw Brenna on Tues for yeast infection and her sx's feel worse- She took the Diflucan as directed. Her itching/ and vaginal discharge is more clumpy and foul smelling.    Pharmacy [address]  Pls call pt if new rx is being sent    Routing to Brenna ZAVALA

## 2019-01-10 NOTE — TELEPHONE ENCOUNTER
Contacted the pt- Reviewed that medication has been ordered and need for tub soaks. Pt verbalizes understanding.

## 2019-01-10 NOTE — TELEPHONE ENCOUNTER
She only tested + for yeast in office. No BV.     However, RX sent for more diflucan and metro gel. Encourage warm bath soaks to get yeast/bacteria moving quicker and better symptom relief.

## 2019-05-02 NOTE — PROGRESS NOTES
"Mina is a 19 year old  female who presents for annual exam.     Besides routine health maintenance, she has no other health concerns today .  HPI:  Here for pap smear. Accepts STI screening. Monogamous relationship, denies concerns, but \"just wants to be sure.\" Working at [employer], no longer in school, living at home with mother. Reports things are generally going well. Vaginal symptoms have cleared up, no HSV outbreaks.  Menses are irregular-- had stopped with Mirena, due to be removed and replaced in July, started again last month  No LMP recorded. (Menstrual status: IUD)..   Using IUD and condoms for contraception.    She is not currently considering pregnancy.    REPRODUCTIVE/GYNECOLOGIC HISTORY:  Mina is sexually active with male partner(s) and is currently in monogamous relationship.   STI testing offered?  Accepted  History of abnormal Pap smear:  No  She  reports that she has quit smoking. She has never used smokeless tobacco.    Last PHQ-9 score on record =   PHQ-9 SCORE 5/3/2019   PHQ-9 Total Score -   PHQ-9 Total Score 0     Last GAD7 score on record =   JAMILA-7 SCORE 5/3/2019   Total Score -   Total Score 0     Alcohol Score = 5    HEALTH MAINTENANCE:  Cholesterol: (No results found for: CHOL History of abnormal lipids: No  Mammo: US on 2/3/12 . History of abnormal Mammo: Benign 2.  Regular Self Breast Exams: No  TSH:   TSH   Date Value Ref Range Status   2015 0.69 0.40 - 4.00 mU/L Final      Pap; (No results found for: PAP ) due  Immunizations up to date: yes  (Gardasil, Tdap, Flu)  Health maintenance updated:  yes    HEALTHY HABITS  Eating habits: eats regular meals and follows a balanced nutrition diet  Exercise: How often do you exercise? 1-3 times/week  Have you had an eye exam in the last two years? YES  Do you routinely see the dentist once or twice yearly? YES      HISTORY:  OB History    Para Term  AB Living   0 0 0 0 0 0   SAB TAB Ectopic Multiple Live Births   0 0 0 0 0 "     Past Medical History:   Diagnosis Date     Anxiety disorder 9/24/2012     Past Surgical History:   Procedure Laterality Date     HC EXC BENIGN SKIN LESION FACE/EARS <=0.5 CM  infant     Family History   Problem Relation Age of Onset     Cancer Maternal Grandmother         rectal cancer     Gynecology Mother         endometrial hyperplasia, s/p hyterectomy     Social History     Socioeconomic History     Marital status: Single     Spouse name: Not on file     Number of children: Not on file     Years of education: Not on file     Highest education level: Not on file   Occupational History     Not on file   Social Needs     Financial resource strain: Not on file     Food insecurity:     Worry: Not on file     Inability: Not on file     Transportation needs:     Medical: Not on file     Non-medical: Not on file   Tobacco Use     Smoking status: Former Smoker     Smokeless tobacco: Never Used     Tobacco comment: 3 cigarettes per week/trying to quit. Has been about 2 weeks since last cigarette.   Substance and Sexual Activity     Alcohol use: Yes     Alcohol/week: 0.0 oz     Comment:  6/27/12   8/13/12      Drug use: No     Comment:  6/27/12   8/13/12 2/7/2014 cc     Sexual activity: Yes     Partners: Male     Birth control/protection: IUD, Condom     Comment: sometimes uses condoms w/ IUD   Lifestyle     Physical activity:     Days per week: Not on file     Minutes per session: Not on file     Stress: Not on file   Relationships     Social connections:     Talks on phone: Not on file     Gets together: Not on file     Attends [employer] service: Not on file     Active member of club or organization: Not on file     Attends meetings of clubs or organizations: Not on file     Relationship status: Not on file     Intimate partner violence:     Fear of current or ex partner: Not on file     Emotionally abused: Not on file     Physically abused: Not on file     Forced sexual activity: Not on file   Other Topics  "Concern     Parent/sibling w/ CABG, MI or angioplasty before 65F 55M? Not Asked   Social History Narrative     Not on file       Current Outpatient Medications:      buPROPion (WELLBUTRIN XL) 300 MG 24 hr tablet, Take 1 tablet (300 mg) by mouth every morning, Disp: 30 tablet, Rfl: 2     citalopram (CELEXA) 40 MG tablet, Take 1 tablet (40 mg) by mouth daily, Disp: 90 tablet, Rfl: 0     gabapentin (NEURONTIN) 300 MG capsule, Take 300 mg by mouth 2 times daily , Disp: , Rfl:      levonorgestrel (MIRENA) 20 MCG/24HR IUD, 1 each (20 mcg) by Intrauterine route once for 1 dose, Disp: 1 each, Rfl: 0     valACYclovir (VALTREX) 500 MG tablet, Take 1 tablet (500 mg) by mouth daily . Double up during an outbreak, Disp: 180 tablet, Rfl: 1     Allergies   Allergen Reactions     Hydrocodone Nausea and Vomiting     Oxycodone Nausea and Vomiting       Past medical, surgical, social and family history were reviewed and updated in EPIC.    ROS:   12 point review of systems negative other than symptoms noted below.    PHYSICAL EXAM:  /68   Pulse 80   Ht 1.613 m (5' 3.5\")   Wt 54 kg (119 lb)   BMI 20.75 kg/m     BMI: Body mass index is 20.75 kg/m .  Constitutional: healthy, alert and no distress  Neck: symmetrical, thyroid normal size, no masses present, no lymphadenopathy present.   Cardiovascular: RRR, no murmurs present  Respiratory: breathing unlabored, lungs CTA bilaterally  Breast: deferred, denies concerns  Gastrointestinal: abdomen soft, non-tender  PELVIC EXAM:  Vulva: No lesions, no adenopathy, BUS WNL  Vagina: Moist, pink, discharge normal  well rugated, no lesions  Cervix:smooth, pink, no visible lesions. IUD strings visible 1/2 cm from os!  Rectal exam: deferred    ASSESSMENT/PLAN:    ICD-10-CM    1. Encounter for gynecological examination without abnormal finding Z01.419 Pap imaged thin layer screen only - recommended age 21 - 24 years     Wet prep   2. Screen for STD (sexually transmitted disease) Z11.3 " NEISSERIA GONORRHOEA PCR     Treponema Abs w Reflex to RPR and Titer     HIV Antigen Antibody Combo     Wet prep   3. Screening for chlamydial disease Z11.8 CHLAMYDIA TRACHOMATIS PCR   4. IUD check up Z30.431      Results for orders placed or performed in visit on 19   Wet prep   Result Value Ref Range    Specimen Description Vagina     Wet Prep No Trichomonas seen     Wet Prep No yeast seen     Wet Prep No clue cells seen     Wet Prep No WBC's seen      COUNSELING:   -IUD strings now visible at os-- may be helpful in removal / reinsertion in July!  Reviewed preventive health counseling, as reflected in patient instructions  Special attention given to:        Regular exercise       Healthy diet/nutrition       Vision screening       Contraception       Safe sex practices/STD prevention       HIV screeninx in teen years, 1x in adult years, and at intervals if high risk  -Will call with pap and STI results. If pap is normal, repeat in 3 years  -RTC in July for Mirena removal / reinsertion, one year for RHM, sooner if problems    Brenna Arvizu SALLY  [facility]ARN[facility], Brenna Arvizu, am serving as a scribe; to document services personally performed by Marissa MICHELLE CNM- based on data collection and the provider's statements to me.    Provider Disclosure:  I agree with above History, Review of Systems, Physical exam and Plan. I have reviewed the content of the documentation and have edited it as needed. I have personally performed the services documented here and the documentation accurately represents those services and the decisions I have made.    Marissa MICHELLE CNM

## 2019-05-03 ENCOUNTER — OFFICE VISIT (OUTPATIENT)
Dept: MIDWIFE SERVICES | Facility: CLINIC | Age: 19
End: 2019-05-03
Payer: COMMERCIAL

## 2019-05-03 VITALS
WEIGHT: 119 LBS | SYSTOLIC BLOOD PRESSURE: 112 MMHG | BODY MASS INDEX: 20.32 KG/M2 | HEIGHT: 64 IN | HEART RATE: 80 BPM | DIASTOLIC BLOOD PRESSURE: 68 MMHG

## 2019-05-03 DIAGNOSIS — Z11.8 SCREENING FOR CHLAMYDIAL DISEASE: ICD-10-CM

## 2019-05-03 DIAGNOSIS — Z11.3 SCREEN FOR STD (SEXUALLY TRANSMITTED DISEASE): ICD-10-CM

## 2019-05-03 DIAGNOSIS — Z30.431 IUD CHECK UP: ICD-10-CM

## 2019-05-03 DIAGNOSIS — Z01.419 ENCOUNTER FOR GYNECOLOGICAL EXAMINATION WITHOUT ABNORMAL FINDING: Primary | ICD-10-CM

## 2019-05-03 LAB
SPECIMEN SOURCE: NORMAL
WET PREP SPEC: NORMAL

## 2019-05-03 PROCEDURE — 87591 N.GONORRHOEAE DNA AMP PROB: CPT | Performed by: NURSE PRACTITIONER

## 2019-05-03 PROCEDURE — 36415 COLL VENOUS BLD VENIPUNCTURE: CPT | Performed by: NURSE PRACTITIONER

## 2019-05-03 PROCEDURE — 86780 TREPONEMA PALLIDUM: CPT | Performed by: NURSE PRACTITIONER

## 2019-05-03 PROCEDURE — 87389 HIV-1 AG W/HIV-1&-2 AB AG IA: CPT | Performed by: NURSE PRACTITIONER

## 2019-05-03 PROCEDURE — 99395 PREV VISIT EST AGE 18-39: CPT | Performed by: NURSE PRACTITIONER

## 2019-05-03 PROCEDURE — 87210 SMEAR WET MOUNT SALINE/INK: CPT | Performed by: NURSE PRACTITIONER

## 2019-05-03 PROCEDURE — 87491 CHLMYD TRACH DNA AMP PROBE: CPT | Performed by: NURSE PRACTITIONER

## 2019-05-03 PROCEDURE — G0145 SCR C/V CYTO,THINLAYER,RESCR: HCPCS | Performed by: NURSE PRACTITIONER

## 2019-05-03 RX ORDER — GABAPENTIN 300 MG/1
300 CAPSULE ORAL 2 TIMES DAILY
COMMUNITY
Start: 2019-03-04 | End: 2024-08-06

## 2019-05-03 ASSESSMENT — MIFFLIN-ST. JEOR: SCORE: 1281.84

## 2019-05-03 ASSESSMENT — ANXIETY QUESTIONNAIRES
2. NOT BEING ABLE TO STOP OR CONTROL WORRYING: NOT AT ALL
5. BEING SO RESTLESS THAT IT IS HARD TO SIT STILL: NOT AT ALL
6. BECOMING EASILY ANNOYED OR IRRITABLE: NOT AT ALL
1. FEELING NERVOUS, ANXIOUS, OR ON EDGE: NOT AT ALL
3. WORRYING TOO MUCH ABOUT DIFFERENT THINGS: NOT AT ALL
7. FEELING AFRAID AS IF SOMETHING AWFUL MIGHT HAPPEN: NOT AT ALL
GAD7 TOTAL SCORE: 0

## 2019-05-03 ASSESSMENT — PATIENT HEALTH QUESTIONNAIRE - PHQ9
SUM OF ALL RESPONSES TO PHQ QUESTIONS 1-9: 0
5. POOR APPETITE OR OVEREATING: NOT AT ALL

## 2019-05-03 NOTE — LETTER
May 9, 2019      Mina Chavarria  [address] [address]    Dear ,      I am happy to inform you that your recent cervical cancer screening test (PAP smear) was normal.      Preventative screenings such as this help to ensure your health for years to come. You should repeat a pap smear in 3 years, unless otherwise directed.      You will still need to return to the clinic every year for your annual exam and other preventive tests.     If you have additional questions regarding this result, please call our registered nurse, Zohra at [phone].      Sincerely,      MIGUEL ANGEL Hernandez CNM/manjinder

## 2019-05-04 LAB — T PALLIDUM AB SER QL: NONREACTIVE

## 2019-05-04 ASSESSMENT — ANXIETY QUESTIONNAIRES: GAD7 TOTAL SCORE: 0

## 2019-05-06 LAB
C TRACH DNA SPEC QL NAA+PROBE: NEGATIVE
HIV 1+2 AB+HIV1 P24 AG SERPL QL IA: NONREACTIVE
N GONORRHOEA DNA SPEC QL NAA+PROBE: NEGATIVE
SPECIMEN SOURCE: NORMAL
SPECIMEN SOURCE: NORMAL

## 2019-05-07 LAB
COPATH REPORT: NORMAL
PAP: NORMAL

## 2019-05-13 ENCOUNTER — TELEPHONE (OUTPATIENT)
Dept: OBGYN | Facility: CLINIC | Age: 19
End: 2019-05-13

## 2019-06-17 ENCOUNTER — TELEPHONE (OUTPATIENT)
Dept: MIDWIFE SERVICES | Facility: CLINIC | Age: 19
End: 2019-06-17

## 2019-06-17 NOTE — TELEPHONE ENCOUNTER
Patient scheduled for IUD remove/insert 6/27, she reports when she was seen last by Brenna Jenkins she was told the strings were not visible.  Does she need imaging for this visit?

## 2019-06-17 NOTE — TELEPHONE ENCOUNTER
Mail box full unable to leave message.       5/3/19Cervix:smooth, pink, no visible lesions. IUD strings visible 1/2 cm from os!

## 2019-06-27 ENCOUNTER — OFFICE VISIT (OUTPATIENT)
Dept: MIDWIFE SERVICES | Facility: CLINIC | Age: 19
End: 2019-06-27
Payer: COMMERCIAL

## 2019-06-27 VITALS — DIASTOLIC BLOOD PRESSURE: 62 MMHG | BODY MASS INDEX: 20.92 KG/M2 | SYSTOLIC BLOOD PRESSURE: 110 MMHG | WEIGHT: 120 LBS

## 2019-06-27 DIAGNOSIS — Z30.433 ENCOUNTER FOR REMOVAL AND REINSERTION OF INTRAUTERINE CONTRACEPTIVE DEVICE: Primary | ICD-10-CM

## 2019-06-27 PROCEDURE — 58301 REMOVE INTRAUTERINE DEVICE: CPT | Mod: 52 | Performed by: ADVANCED PRACTICE MIDWIFE

## 2019-06-27 PROCEDURE — 99214 OFFICE O/P EST MOD 30 MIN: CPT | Mod: 25 | Performed by: ADVANCED PRACTICE MIDWIFE

## 2019-06-27 NOTE — PROGRESS NOTES
MIDWIFE IUD  NOTE      HPI:   Mina Chavarria is a 19 year old female here today for IUD removal and re-insertion. Has had no issues with Mirena and is happy with current method of birth control.   No LMP recorded. (Menstrual status: IUD). States not getting a period on Mirena except did spot some last month.   Today's pregnancy test - NA. Mirena in place  Patient did not use Cytotec prior to IUD insertion  STD testing offered? Declined  Patient does not have any infections or cervicitis  Patient does not have history of liver problems or cancer.     Patient has been given written information.  I have reviewed the risks of the IUD including pregnancy, PID, life threatening infection, perforation, expulsion, cramping, changes in bleeding and ovarian cysts. Benefits of the IUD and alternative family planning methods have been discussed.  The probable mechanisms of action were covered, failure rates, spontaneous expulsion, the importance of checking the string monthly, as well as minor or nuisance side effects such as ovarian cysts, migraines, skin changes irregular and unpredictable bleeding in the first 6 months of use and bleeding patterns after the first 6 months.  The 's printed material was provided for her review.  Patients questions are answered.  Patient has verbalized understanding of risks and benefits and has signed the consent form.      Verification of Procedure:  Before the procedure began, I verified:  Patient Name: Mina Chavarria  Yes  Patient :  2000  Yes  Procedure to be performed:  Yes    Health maintenance updated:  yes    Allergies   Allergen Reactions     Hydrocodone Nausea and Vomiting     Oxycodone Nausea and Vomiting     Current Outpatient Medications   Medication Sig Dispense Refill     buPROPion (WELLBUTRIN XL) 300 MG 24 hr tablet Take 1 tablet (300 mg) by mouth every morning 30 tablet 2     citalopram (CELEXA) 40 MG tablet Take 1 tablet (40 mg) by mouth daily 90 tablet 0      gabapentin (NEURONTIN) 300 MG capsule Take 300 mg by mouth 2 times daily        levonorgestrel (MIRENA) 20 MCG/24HR IUD 1 each (20 mcg) by Intrauterine route once for 1 dose 1 each 0     valACYclovir (VALTREX) 500 MG tablet Take 1 tablet (500 mg) by mouth daily . Double up during an outbreak 180 tablet 1      Past Medical History:   Diagnosis Date     Anxiety disorder 9/24/2012     Family History   Problem Relation Age of Onset     Cancer Maternal Grandmother         rectal cancer     Gynecology Mother         endometrial hyperplasia, s/p hyterectomy     Social History     Socioeconomic History     Marital status: Single     Spouse name: Not on file     Number of children: Not on file     Years of education: Not on file     Highest education level: Not on file   Occupational History     Not on file   Social Needs     Financial resource strain: Not on file     Food insecurity:     Worry: Not on file     Inability: Not on file     Transportation needs:     Medical: Not on file     Non-medical: Not on file   Tobacco Use     Smoking status: Former Smoker     Smokeless tobacco: Never Used     Tobacco comment: Quit around a year ago.   Substance and Sexual Activity     Alcohol use: Yes     Alcohol/week: 0.0 oz     Comment:  6/27/12   8/13/12      Drug use: No     Comment:  6/27/12   8/13/12 2/7/2014 cc     Sexual activity: Yes     Partners: Male     Birth control/protection: IUD, Condom     Comment: sometimes uses condoms w/ IUD   Lifestyle     Physical activity:     Days per week: Not on file     Minutes per session: Not on file     Stress: Not on file   Relationships     Social connections:     Talks on phone: Not on file     Gets together: Not on file     Attends [employer] service: Not on file     Active member of club or organization: Not on file     Attends meetings of clubs or organizations: Not on file     Relationship status: Not on file     Intimate partner violence:     Fear of current or ex partner:  Not on file     Emotionally abused: Not on file     Physically abused: Not on file     Forced sexual activity: Not on file   Other Topics Concern     Parent/sibling w/ CABG, MI or angioplasty before 65F 55M? Not Asked   Social History Narrative    Living at home with mother, working. Not going to school at this time (5/2019)     Past Surgical History:   Procedure Laterality Date     HC EXC BENIGN SKIN LESION FACE/EARS <=0.5 CM  infant       REVIEW OF SYSTEMS:  12 point review of systems negative other than symptoms noted below.      EXAM:  /62   Wt 54.4 kg (120 lb)   Breastfeeding? No   BMI 20.92 kg/m      PELVIC EXAM:  Vulva: No external lesions  Vagina: Moist, pink, discharge normal  well rugated, no lesions  Cervix: smooth, pink, no visible lesions, neg CMT, IUD strings not visualized. IUD tip not palpated.   Uterus: Normal size, non-tender, mobile  Ovaries: No mass, non-tender  Rectal exam: Deferred      ASSESSMENT/ PLAN:    ICD-10-CM    1. Encounter for removal and reinsertion of intrauterine contraceptive device Z30.433      IUD removal note    Speculum used to visualize cervix. Unable to visualize IUD strings. Pap brush and IUD removal hook used for over 15 minutes to try and coax strings from cervical os. Patient states she was seen in the clinic 5/3/2019 for a different vaginal concern and the midwife visualized the strings at that time. Reading previous documentation, IUD strings were 1/2 from os. Mina states a few times in the past providers have not been able to visualize IUD strings and an US was done at one point to verify placement. She does not do string checks as she has not been able to feel them in the past.     COUNSELING:    Explained sometimes IUD strings can migrate into cervical os, especially with cervical mucous making visualizing or palpating strings difficult.     Discussed making appt with MD to have IUD removed and under US and reinserted. No appts available today.     Use  back-up method of birth control if concerned about pregnancy until IUD replacement.    Marilyn MICHELLE CNM    30 minutes was spent face to face with the patient today discussing her history, diagnosis, and follow-up plan as noted above. Over 50% of the visit was spent in counseling and coordination of care.    Total Visit Time: 30 minutes.

## 2019-07-11 NOTE — PROGRESS NOTES
SUBJECTIVE:    Subjective: Mina Chavarria is a 19 year old  presents for IUD and desires Mirena type IUD.  She requests removal of the IUD because the IUD effectiveness has     Patient has been given the opportunity to ask questions about all forms of birth control, including all options appropriate for Mina Chavarria. Discussed that no method of birth control, except abstinence is 100% effective against pregnancy or sexually transmitted infection.     Mina Chavarria understands she may have the IUD removed at any time. IUD should be removed by a health care provider and the current IUD will be removed today.    The entire removal and insertion procedure was reviewed with the patient, including care after placement.        PROCEDURE:      A speculum exam was performed and the cervix was visualized. The IUD string was not visualized. Using uterine packing forceps,  the IUD removed intact.    Under sterile technique, cervix was visualized with speculum and prepped with Betadine solution swab x 3. Tenaculum was placed for stability. The uterus was gently straightened and sounded to 6.5 cm. IUD prepared for placement, and IUD inserted according to 's instructions without difficulty or significant ressitance, and deployed at the fundus. The strings were visualized and trimmed to 4.0 cm from the external os. Tenaculum was removed and hemostasis noted. Speculum removed.  Patient tolerated procedure well.    Mirena  Lot #: [id number]  NDC: 06932-038-46  S/N: [id number]  Exp: 2021    EBL: minimal    Complications: none      POST PROCEDURE:    Given 's handouts, including when to have IUD removed, list of danger s/sx, side effects and follow up recommended. Encouraged condom use for prevention of STD. Advised to call for any fever, for prolonged or severe pain or bleeding, abnormal vaginal dischage, or unable to palpate strings. She was advised to use pain medications (ibuprofen) as needed  for mild to moderate pain. Advised to follow-up in clinic in 4-6 weeks for IUD string check if unable to find strings or as directed by provider.     Derian Gonzales MD

## 2019-07-12 ENCOUNTER — OFFICE VISIT (OUTPATIENT)
Dept: OBGYN | Facility: CLINIC | Age: 19
End: 2019-07-12
Payer: COMMERCIAL

## 2019-07-12 ENCOUNTER — ANCILLARY PROCEDURE (OUTPATIENT)
Dept: ULTRASOUND IMAGING | Facility: CLINIC | Age: 19
End: 2019-07-12
Payer: COMMERCIAL

## 2019-07-12 DIAGNOSIS — Z30.430 ENCOUNTER FOR IUD INSERTION: ICD-10-CM

## 2019-07-12 DIAGNOSIS — Z30.433 ENCOUNTER FOR REMOVAL AND REINSERTION OF INTRAUTERINE CONTRACEPTIVE DEVICE: Primary | ICD-10-CM

## 2019-07-12 PROCEDURE — 58300 INSERT INTRAUTERINE DEVICE: CPT | Performed by: OBSTETRICS & GYNECOLOGY

## 2019-07-12 PROCEDURE — 58301 REMOVE INTRAUTERINE DEVICE: CPT | Performed by: OBSTETRICS & GYNECOLOGY

## 2019-07-12 PROCEDURE — 76998 US GUIDE INTRAOP: CPT | Performed by: OBSTETRICS & GYNECOLOGY

## 2019-07-12 NOTE — NURSING NOTE
Clinic Administered Medication Documentation      Intrauterine/Implant Insertion Documentation    Device was placed by provider (please see MAR for given by information). Please see MAR and medication order for additional information.     Type: Mirena  Remove/Replace by: Dr eDrian Gonzales     Exp: 11/2021

## 2019-11-20 NOTE — PROGRESS NOTES
SUBJECTIVE:                                                   Mina Chavarria is a 19 year old female who presents to clinic today for the following health issue(s):  Patient presents with:  STD: here to have STD testing      HPI:  Pt here today requesting STD testing. She has a new sexual partner. No known exposure.     STD testing was negative in May 2019. Pt has a hx of HSV. Will not test for this today.     IUD exchanged in July. Mirena IUD.     Pt denies any pelvic pain/fever/chills/vaginal symptoms or urinary symptoms.     No LMP recorded. (Menstrual status: IUD)..     Patient is sexually active, .  Using IUD for contraception.    reports that she has quit smoking. She has never used smokeless tobacco.    STD testing offered?  Accepted    Health maintenance updated:  yes    Today's PHQ-2 Score:   PHQ-2 (  [employer]) 2019   Q1: Little interest or pleasure in doing things 0   Q2: Feeling down, depressed or hopeless 0   PHQ-2 Score 0     Today's PHQ-9 Score:   PHQ-9 SCORE 5/3/2019   PHQ-9 Total Score -   PHQ-9 Total Score 0     Today's JAMILA-7 Score:   JAMILA-7 SCORE 5/3/2019   Total Score -   Total Score 0       Problem list and histories reviewed & adjusted, as indicated.  Additional history: as documented.    Patient Active Problem List   Diagnosis     Herpes simplex     Major depressive disorder, recurrent episode, severe (H)     Anxiety disorder     IUD check up     Constipation     Recurrent UTI     Attention deficit disorder (ADD) without hyperactivity     H/O self-harm     Past Surgical History:   Procedure Laterality Date     HC EXC BENIGN SKIN LESION FACE/EARS <=0.5 CM  infant      Social History     Tobacco Use     Smoking status: Former Smoker     Smokeless tobacco: Never Used     Tobacco comment: Quit around a year ago.   Substance Use Topics     Alcohol use: Yes     Alcohol/week: 0.0 standard drinks     Comment:  12  hf 12       Problem (# of Occurrences) Relation (Name,Age of  "Onset)    Cancer (1) Maternal Grandmother: rectal cancer    Gynecology (1) Mother: endometrial hyperplasia, s/p hyterectomy            Current Outpatient Medications   Medication Sig     buPROPion (WELLBUTRIN XL) 300 MG 24 hr tablet Take 1 tablet (300 mg) by mouth every morning     citalopram (CELEXA) 40 MG tablet Take 1 tablet (40 mg) by mouth daily     gabapentin (NEURONTIN) 300 MG capsule Take 300 mg by mouth 2 times daily      levonorgestrel (MIRENA) 20 MCG/24HR IUD 1 each by Intrauterine route once     valACYclovir (VALTREX) 500 MG tablet Take 1 tablet (500 mg) by mouth daily . Double up during an outbreak     No current facility-administered medications for this visit.      Allergies   Allergen Reactions     Hydrocodone Nausea and Vomiting     Oxycodone Nausea and Vomiting       ROS:  12 point review of systems negative other than symptoms noted below or in the HPI.  No urinary frequency or dysuria, bladder or kidney problems      OBJECTIVE:     /72   Pulse 68   Ht 1.613 m (5' 3.5\")   Wt 53.1 kg (117 lb)   BMI 20.40 kg/m    Body mass index is 20.4 kg/m .    Exam:  Constitutional:  Appearance: Well nourished, well developed alert, in no acute distress  Psychiatric:  Mentation appears normal and affect normal/bright.     In-Clinic Test Results:  No results found for this or any previous visit (from the past 24 hour(s)).    ASSESSMENT/PLAN:                                                        ICD-10-CM    1. Screen for STD (sexually transmitted disease) Z11.3 NEISSERIA GONORRHOEA PCR     Treponema Abs w Reflex to RPR and Titer   2. Screening for chlamydial disease Z11.8 CHLAMYDIA TRACHOMATIS PCR   3. Screening for HIV (human immunodeficiency virus) Z11.4 HIV Antigen Antibody Combo       There are no Patient Instructions on file for this visit.    STD testing today. Will update on results. RTC PRN. Pap up to date May 2019. NIL.    5 minute visit. All of which was in face to face counseling.     Brenna" MIGUEL ANGEL Winters CNP  [facility]

## 2019-11-21 ENCOUNTER — OFFICE VISIT (OUTPATIENT)
Dept: OBGYN | Facility: CLINIC | Age: 19
End: 2019-11-21
Payer: COMMERCIAL

## 2019-11-21 VITALS
SYSTOLIC BLOOD PRESSURE: 110 MMHG | BODY MASS INDEX: 19.97 KG/M2 | WEIGHT: 117 LBS | HEART RATE: 68 BPM | DIASTOLIC BLOOD PRESSURE: 72 MMHG | HEIGHT: 64 IN

## 2019-11-21 DIAGNOSIS — Z11.4 SCREENING FOR HIV (HUMAN IMMUNODEFICIENCY VIRUS): ICD-10-CM

## 2019-11-21 DIAGNOSIS — Z11.3 SCREEN FOR STD (SEXUALLY TRANSMITTED DISEASE): Primary | ICD-10-CM

## 2019-11-21 DIAGNOSIS — Z11.8 SCREENING FOR CHLAMYDIAL DISEASE: ICD-10-CM

## 2019-11-21 PROCEDURE — 87491 CHLMYD TRACH DNA AMP PROBE: CPT | Performed by: NURSE PRACTITIONER

## 2019-11-21 PROCEDURE — 86780 TREPONEMA PALLIDUM: CPT | Performed by: NURSE PRACTITIONER

## 2019-11-21 PROCEDURE — 36415 COLL VENOUS BLD VENIPUNCTURE: CPT | Performed by: NURSE PRACTITIONER

## 2019-11-21 PROCEDURE — 99212 OFFICE O/P EST SF 10 MIN: CPT | Performed by: NURSE PRACTITIONER

## 2019-11-21 PROCEDURE — 87389 HIV-1 AG W/HIV-1&-2 AB AG IA: CPT | Performed by: NURSE PRACTITIONER

## 2019-11-21 PROCEDURE — 87591 N.GONORRHOEAE DNA AMP PROB: CPT | Performed by: NURSE PRACTITIONER

## 2019-11-21 ASSESSMENT — MIFFLIN-ST. JEOR: SCORE: 1272.77

## 2019-11-22 LAB
C TRACH DNA SPEC QL NAA+PROBE: NEGATIVE
HIV 1+2 AB+HIV1 P24 AG SERPL QL IA: NONREACTIVE
N GONORRHOEA DNA SPEC QL NAA+PROBE: NEGATIVE
SPECIMEN SOURCE: NORMAL
SPECIMEN SOURCE: NORMAL
T PALLIDUM AB SER QL: NONREACTIVE

## 2019-11-22 NOTE — RESULT ENCOUNTER NOTE
Called patient with results. no lacrimation L/no blurred vision L/no blurred vision R/no lacrimation R

## 2019-11-25 DIAGNOSIS — B00.9 HSV-1 INFECTION: ICD-10-CM

## 2019-11-25 RX ORDER — VALACYCLOVIR HYDROCHLORIDE 500 MG/1
TABLET, FILM COATED ORAL
Qty: 180 TABLET | Refills: 0 | Status: SHIPPED | OUTPATIENT
Start: 2019-11-25 | End: 2021-02-04

## 2019-11-25 NOTE — TELEPHONE ENCOUNTER
"Requested Prescriptions   Pending Prescriptions Disp Refills     valACYclovir (VALTREX) 500 MG tablet [Pharmacy Med Name: VALACYCLOVIR  MG TABLET] 180 tablet 1     Sig: TAKE 1 TABLET BY MOUTH DAILY . DOUBLE UP DURING AN OUTBREAK       Antivirals for Herpes Protocol Failed - 11/25/2019  2:43 AM        Failed - Normal serum creatinine on file in past 12 months     Recent Labs   Lab Test 12/16/15  1416   CR 0.66             Passed - Patient is age 12 or older        Passed - Recent (12 mo) or future (30 days) visit within the authorizing provider's specialty     Patient has had an office visit with the authorizing provider or a provider within the authorizing providers department within the previous 12 mos or has a future within next 30 days. See \"Patient Info\" tab in inbasket, or \"Choose Columns\" in Meds & Orders section of the refill encounter.              Passed - Medication is active on med list        Medication is being filled for 1 time refill only due to:  appointment needed for further refills   Betty Chowdhury RN on 11/25/2019 at 8:00 AM      "

## 2019-11-29 ENCOUNTER — HOSPITAL ENCOUNTER (EMERGENCY)
Facility: CLINIC | Age: 19
Discharge: HOME | End: 2019-11-29
Attending: EMERGENCY MEDICINE | Admitting: EMERGENCY MEDICINE
Payer: COMMERCIAL

## 2019-11-29 ENCOUNTER — OFFICE VISIT (OUTPATIENT)
Dept: URGENT CARE | Facility: URGENT CARE | Age: 19
End: 2019-11-29
Payer: COMMERCIAL

## 2019-11-29 VITALS
OXYGEN SATURATION: 97 % | RESPIRATION RATE: 16 BRPM | TEMPERATURE: 97.8 F | HEART RATE: 61 BPM | BODY MASS INDEX: 19.81 KG/M2 | WEIGHT: 116 LBS | DIASTOLIC BLOOD PRESSURE: 70 MMHG | SYSTOLIC BLOOD PRESSURE: 113 MMHG | HEIGHT: 64 IN

## 2019-11-29 VITALS
OXYGEN SATURATION: 100 % | BODY MASS INDEX: 20.23 KG/M2 | DIASTOLIC BLOOD PRESSURE: 68 MMHG | WEIGHT: 116 LBS | HEART RATE: 64 BPM | TEMPERATURE: 97.5 F | SYSTOLIC BLOOD PRESSURE: 92 MMHG

## 2019-11-29 DIAGNOSIS — K92.1 MELENA: ICD-10-CM

## 2019-11-29 DIAGNOSIS — R11.2 NON-INTRACTABLE VOMITING WITH NAUSEA, UNSPECIFIED VOMITING TYPE: ICD-10-CM

## 2019-11-29 DIAGNOSIS — R11.2 INTRACTABLE VOMITING WITH NAUSEA, UNSPECIFIED VOMITING TYPE: Primary | ICD-10-CM

## 2019-11-29 LAB
ALBUMIN SERPL-MCNC: 4.5 G/DL (ref 3.4–5)
ALP SERPL-CCNC: 63 U/L (ref 40–150)
ALT SERPL W P-5'-P-CCNC: 48 U/L (ref 0–50)
ANION GAP SERPL CALCULATED.3IONS-SCNC: 5 MMOL/L (ref 3–14)
AST SERPL W P-5'-P-CCNC: 38 U/L (ref 0–45)
BASOPHILS # BLD AUTO: 0.1 10E9/L (ref 0–0.2)
BASOPHILS NFR BLD AUTO: 1 %
BILIRUB SERPL-MCNC: 1.6 MG/DL (ref 0.2–1.3)
BUN SERPL-MCNC: 14 MG/DL (ref 7–30)
CALCIUM SERPL-MCNC: 9.7 MG/DL (ref 8.5–10.1)
CHLORIDE SERPL-SCNC: 102 MMOL/L (ref 94–109)
CO2 SERPL-SCNC: 27 MMOL/L (ref 20–32)
CREAT SERPL-MCNC: 0.68 MG/DL (ref 0.52–1.04)
DIFFERENTIAL METHOD BLD: NORMAL
EOSINOPHIL # BLD AUTO: 0.1 10E9/L (ref 0–0.7)
EOSINOPHIL NFR BLD AUTO: 1 %
ERYTHROCYTE [DISTWIDTH] IN BLOOD BY AUTOMATED COUNT: 11.6 % (ref 10–15)
GFR SERPL CREATININE-BSD FRML MDRD: >90 ML/MIN/{1.73_M2}
GLUCOSE SERPL-MCNC: 82 MG/DL (ref 70–99)
HCT VFR BLD AUTO: 41.5 % (ref 35–47)
HEMOCCULT STL QL: NEGATIVE
HGB BLD-MCNC: 13.9 G/DL (ref 11.7–15.7)
IMM GRANULOCYTES # BLD: 0 10E9/L (ref 0–0.4)
IMM GRANULOCYTES NFR BLD: 0.1 %
LIPASE SERPL-CCNC: 45 U/L (ref 73–393)
LYMPHOCYTES # BLD AUTO: 2.1 10E9/L (ref 0.8–5.3)
LYMPHOCYTES NFR BLD AUTO: 23.8 %
MCH RBC QN AUTO: 32.3 PG (ref 26.5–33)
MCHC RBC AUTO-ENTMCNC: 33.5 G/DL (ref 31.5–36.5)
MCV RBC AUTO: 96 FL (ref 78–100)
MONOCYTES # BLD AUTO: 0.8 10E9/L (ref 0–1.3)
MONOCYTES NFR BLD AUTO: 9 %
NEUTROPHILS # BLD AUTO: 5.6 10E9/L (ref 1.6–8.3)
NEUTROPHILS NFR BLD AUTO: 65.1 %
NRBC # BLD AUTO: 0 10*3/UL
NRBC BLD AUTO-RTO: 0 /100
PLATELET # BLD AUTO: 371 10E9/L (ref 150–450)
POTASSIUM SERPL-SCNC: 4 MMOL/L (ref 3.4–5.3)
PROT SERPL-MCNC: 7.9 G/DL (ref 6.8–8.8)
RBC # BLD AUTO: 4.31 10E12/L (ref 3.8–5.2)
SODIUM SERPL-SCNC: 134 MMOL/L (ref 133–144)
WBC # BLD AUTO: 8.7 10E9/L (ref 4–11)

## 2019-11-29 PROCEDURE — 83690 ASSAY OF LIPASE: CPT | Performed by: EMERGENCY MEDICINE

## 2019-11-29 PROCEDURE — 85025 COMPLETE CBC W/AUTO DIFF WBC: CPT | Performed by: EMERGENCY MEDICINE

## 2019-11-29 PROCEDURE — 25000128 H RX IP 250 OP 636: Performed by: EMERGENCY MEDICINE

## 2019-11-29 PROCEDURE — 82272 OCCULT BLD FECES 1-3 TESTS: CPT | Performed by: EMERGENCY MEDICINE

## 2019-11-29 PROCEDURE — C9113 INJ PANTOPRAZOLE SODIUM, VIA: HCPCS | Performed by: EMERGENCY MEDICINE

## 2019-11-29 PROCEDURE — 80053 COMPREHEN METABOLIC PANEL: CPT | Performed by: EMERGENCY MEDICINE

## 2019-11-29 PROCEDURE — 96361 HYDRATE IV INFUSION ADD-ON: CPT

## 2019-11-29 PROCEDURE — 96375 TX/PRO/DX INJ NEW DRUG ADDON: CPT

## 2019-11-29 PROCEDURE — 96376 TX/PRO/DX INJ SAME DRUG ADON: CPT

## 2019-11-29 PROCEDURE — 96374 THER/PROPH/DIAG INJ IV PUSH: CPT

## 2019-11-29 PROCEDURE — 99284 EMERGENCY DEPT VISIT MOD MDM: CPT | Mod: 25

## 2019-11-29 PROCEDURE — 25800030 ZZH RX IP 258 OP 636: Performed by: EMERGENCY MEDICINE

## 2019-11-29 PROCEDURE — 99214 OFFICE O/P EST MOD 30 MIN: CPT | Performed by: FAMILY MEDICINE

## 2019-11-29 RX ORDER — ONDANSETRON 2 MG/ML
4 INJECTION INTRAMUSCULAR; INTRAVENOUS ONCE
Status: COMPLETED | OUTPATIENT
Start: 2019-11-29 | End: 2019-11-29

## 2019-11-29 RX ORDER — ONDANSETRON 4 MG/1
4 TABLET, ORALLY DISINTEGRATING ORAL EVERY 6 HOURS PRN
Qty: 10 TABLET | Refills: 0 | Status: SHIPPED | OUTPATIENT
Start: 2019-11-29 | End: 2019-12-02

## 2019-11-29 RX ADMIN — PANTOPRAZOLE SODIUM 40 MG: 40 INJECTION, POWDER, FOR SOLUTION INTRAVENOUS at 19:41

## 2019-11-29 RX ADMIN — SODIUM CHLORIDE 1000 ML: 9 INJECTION, SOLUTION INTRAVENOUS at 19:41

## 2019-11-29 RX ADMIN — ONDANSETRON 4 MG: 2 INJECTION INTRAMUSCULAR; INTRAVENOUS at 19:41

## 2019-11-29 RX ADMIN — ONDANSETRON 4 MG: 2 INJECTION INTRAMUSCULAR; INTRAVENOUS at 21:01

## 2019-11-29 ASSESSMENT — ENCOUNTER SYMPTOMS
VOMITING: 1
DIZZINESS: 0
HEADACHES: 1
BACK PAIN: 1
LIGHT-HEADEDNESS: 1
ABDOMINAL PAIN: 1

## 2019-11-29 ASSESSMENT — MIFFLIN-ST. JEOR: SCORE: 1276.17

## 2019-11-29 NOTE — ED AVS SNAPSHOT
Emergency Department  [address] [place] [address]  Phone:  [phone]  Fax:  [phone]                                    Mina Chavarria   MRN: [id number]    Department:   Emergency Department   Date of Visit:  11/29/2019           After Visit Summary Signature Page    I have received my discharge instructions, and my questions have been answered. I have discussed any challenges I see with this plan with the nurse or doctor.    ..........................................................................................................................................  Patient/Patient Representative Signature      ..........................................................................................................................................  Patient Representative Print Name and Relationship to Patient    ..................................................               ................................................  Date                                   Time    ..........................................................................................................................................  Reviewed by Signature/Title    ...................................................              ..............................................  Date                                               Time          22EPIC Rev 08/18

## 2019-11-30 NOTE — PROGRESS NOTES
Subjective     Mina Chavarria is a 19 year old female who presents to clinic today for the following health issues:    HPI   Chief Complaint   Patient presents with     Vomiting       Duration: yesterday     Description (location/character/radiation): vomiting since yesterday, cannot keep any down, had 2 x black stools today     Intensity:  moderate    Accompanying signs and symptoms: abdominal cramping, no fever, chills, urinary disturbance, abnormal vaginal discharge     History (similar episodes/previous evaluation): none     Precipitating or alleviating factors: none    Therapies tried and outcome: pepto bismul    Work at a restaurant, 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   Substance Use Topics     Alcohol use: Yes     Alcohol/week: 0.0 standard drinks     Comment:  6/27/12   8/13/12      Family History   Problem Relation Age of Onset     Cancer Maternal Grandmother         rectal cancer     Gynecology Mother         endometrial hyperplasia, s/p hyterectomy         Current Outpatient Medications   Medication Sig Dispense Refill     buPROPion (WELLBUTRIN XL) 300 MG 24 hr tablet Take 1 tablet (300 mg) by mouth every morning 30 tablet 2     citalopram (CELEXA) 40 MG tablet Take 1 tablet (40 mg) by mouth daily 90 tablet 0     gabapentin (NEURONTIN) 300 MG capsule Take 300 mg by mouth 2 times daily        levonorgestrel (MIRENA) 20 MCG/24HR IUD 1 each by Intrauterine route once        valACYclovir (VALTREX) 500 MG tablet TAKE 1 TABLET BY MOUTH DAILY . DOUBLE UP DURING AN OUTBREAK 180 tablet 0     Allergies   Allergen Reactions     Hydrocodone Nausea and Vomiting     Oxycodone Nausea and Vomiting     Recent Labs   Lab Test 12/16/15  1416 01/20/15  1022 05/02/14  0956 04/28/14  0945   ALT 23 17  --  20   CR 0.66 0.77 1.03* 1.45*   GFRESTIMATED >90  Non  GFR Calc   >90  Non  GFR Calc   71 48*   GFRESTBLACK >90   GFR Calc   >90   GFR Calc   86 58*   POTASSIUM 3.9 3.7 4.6 4.8   TSH 0.69  --  0.96  --       BP Readings from Last 3 Encounters:   11/29/19 92/68   11/21/19 110/72   06/27/19 110/62    Wt Readings from Last 3 Encounters:   11/29/19 52.6 kg (116 lb)   11/21/19 53.1 kg (117 lb)   06/27/19 54.4 kg (120 lb)               Reviewed and updated as needed this visit by Provider         Review of Systems   ROS COMP: Constitutional, HEENT, cardiovascular, pulmonary, GI, , musculoskeletal, neuro, skin, endocrine and psych systems are negative, except as otherwise noted.      Objective    BP 92/68 (BP Location: Left arm, Cuff Size: Adult Regular)   Pulse 64   Temp 97.5  F (36.4  C) (Tympanic)   Wt 52.6 kg (116 lb)   SpO2 100%   BMI 20.23 kg/m    Body mass index is 20.23 kg/m .  Physical Exam   GENERAL: alert and no distress  EYES: Eyes grossly normal to inspection, PERRL and conjunctivae and sclerae normal  HENT: normal cephalic/atraumatic, ear canals and TM's normal, oropharynx clear and oral mucous membranes moist  NECK: no adenopathy, no asymmetry, masses, or scars and thyroid normal to palpation  RESP: lungs clear to auscultation - no rales, rhonchi or wheezes  CV: regular rates and rhythm, normal S1 S2, no S3 or S4 and no murmur, click or rub  ABDOMEN: soft, nontender and borborygmi on auscultation  MS: no gross musculoskeletal defects noted, no edema  SKIN: no suspicious lesions or rashes  NEURO: Normal strength and tone,  mentation intact and speech normal  PSYCH: mentation appears normal, affect normal/bright      Assessment & Plan       ICD-10-CM    1. Intractable vomiting with nausea, unspecified vomiting type R11.2    2. Melena K92.1         19-year-old female presents with nausea/vomiting which she is experiencing since yesterday, unable to keep anything down, noticing black stools since morning, has had 2 episodes today, feeling tired/fatigue.  Physical examination remarkable for mild hypotension.  Differentials discussed in detail including but not limited to bleeding peptic ulcer, gastritis and gastroenteritis.  Needs comprehensive evaluation to delineate a specific diagnosis.  Suggested to go ER for further evaluation and management.  Patient/mother understood and in agreement with above plan.  All questions answered.      Zia Yung MD  [facility]

## 2019-11-30 NOTE — ED NOTES
Pt given a glass of water for PO challenge.   Lynette Regalado RN,.......................................... 11/29/2019   8:44 PM

## 2019-11-30 NOTE — ED TRIAGE NOTES
"Pt reports she has been vomiting since yesterday and is unable to keep anything down. Pt reports abdominal discomfort and \"dark, tarry, stools\" pt denies bianka red blood in stool.   "

## 2019-11-30 NOTE — ED PROVIDER NOTES
"  History     Chief Complaint:  Vomiting and Melena      HPI   Mina Chavarria is a 19 year old female who presents to the emergency department with her mother for evaluation of vomiting and dark stools. The patient reports her symptoms started yesterday when she woke up and had three episodes of emesis and has since been unable to keep food or water down. She states her vomit does contain small streaks of blood but nothing significant. She also reports one episode of black, tarry stool 3 hours ago, lightheadedness, mild headache, mild back pain.  She denies abd pain, urinary sxs. She denies dizziness and contact with other sick people. Of note the patient states she took Pepto-bismol but believes she threw it up.     Allergies:  Hydrocodone  Oxycodone     Medications:    bupropion  citalopram   gabapentin   levonorgestrel   valacyclovir    Past Medical History:    Anxiety disorder  IUD in place  Herpes simplex  Depression  ADD  History of self-harm    Past Surgical History:    Excision benign skin lesion face/ears    Family History:    Endometrial hyperplasia - mother    Social History:  Smoking status: light smoker  Alcohol use: yes  Drug use: no  The patient presents to the emergency department with her mother.  PCP: Amanda Arce  Marital Status:  Single     Review of Systems   Gastrointestinal: Positive for abdominal pain and vomiting.   Musculoskeletal: Positive for back pain.   Neurological: Positive for light-headedness and headaches. Negative for dizziness.   All other systems reviewed and are negative.    Physical Exam     Patient Vitals for the past 24 hrs:   BP Temp Temp src Pulse Heart Rate Resp SpO2 Height Weight   11/29/19 2100 119/74 -- -- 64 -- -- 96 % -- --   11/29/19 2030 -- -- -- -- -- -- 95 % -- --   11/29/19 2000 -- -- -- -- -- -- 100 % -- --   11/29/19 1909 133/75 97.8  F (36.6  C) Oral -- 65 16 100 % 1.626 m (5' 4\") 52.6 kg (116 lb)         Physical Exam  General/Appearance: appears " stated age, well-groomed, appears comfortable  Eyes: EOMI, no scleral injection, no icterus  ENT: MMM  Neck: supple, nl ROM, no stiffness  Cardiovascular: RRR, nl S1S2, no m/r/g, 2+ pulses in all 4 extremities, cap refill <2sec  Respiratory: CTAB, good air movement throughout, no wheezes/rhonchi/rales, no increased WOB, no retractions  Back: no lesions  GI: abd soft, non-distended, nttp,  no HSM, no rebound, no guarding, nl BS, no gross blood per rectum nor dark melanotic stools  MSK: FOSTER, good tone, no bony abnormality  Skin: warm and well-perfused, no rash, no edema, no ecchymosis, nl turgor  Neuro: GCS 15, alert and oriented, no gross focal neuro deficits  Psych: interacts appropriately  Heme: no petechia, no purpura, no active bleeding        Emergency Department Course   Laboratory:  CBC: WBC: 8.7, HGB: 13.9, PLT: 371  CMP: Glucose 82, bilirubin total 1.6 (H), o/w WNL (Creatinine: 0.68)  Lipase: 45 (L)  Occult blood stool: Negative    Interventions:  1941 zofran 4 mg IV  1941 NS 1000 mL IV  1941 protonix 40 mg IV  2101 zofran 4 mg IV    Emergency Department Course:  Nursing notes and vitals reviewed. (1919) I performed an exam of the patient as documented above.     IV inserted. Medicine administered as documented above. Blood drawn. This was sent to the lab for further testing, results above.     2014 I rechecked the patient and discussed the results of her workup thus far. She was given ice chips for PO challenge.    2130 I rechecked the patient and discussed the results of her workup thus far.     Findings and plan explained to the Patient. Patient discharged home with instructions regarding supportive care, medications, and reasons to return. The importance of close follow-up was reviewed. The patient was prescribed zofran-odt.    I personally reviewed the laboratory results with the Patient and answered all related questions prior to discharge.      Impression & Plan    Medical Decision Making:  This  patient is a 19-year-old female who presents with nausea, vomiting, dark stools.  I suspect she is got a viral illness.  She does not have abdominal pain nor concerning lab abnormalities.  We were able to control her nausea and vomiting here and she is tolerated p.o.  In regards to the dark stools this occurred prior to her taking Pepto-Bismol.  She does not have a low hemoglobin, is not tachycardic, and is negative occult stools.  I doubt GI bleed.  At this point time I think is reasonable for her to be discharged.  Diagnosis:    ICD-10-CM    1. Non-intractable vomiting with nausea, unspecified vomiting type R11.2        Disposition:  discharged to home    Discharge Medications:  New Prescriptions    ONDANSETRON (ZOFRAN ODT) 4 MG ODT TAB    Take 1 tablet (4 mg) by mouth every 6 hours as needed for nausea or vomiting       Tommie Thompson  11/29/2019    EMERGENCY DEPARTMENT  Scribe Disclosure:  I, Tommie Thompson, am serving as a scribe at 7:19 PM on 11/29/2019 to document services personally performed by Alana Jean based on my observations and the provider's statements to me.        Alana Jean MD  11/29/19 0609

## 2020-06-10 NOTE — TELEPHONE ENCOUNTER
FNA left message for patient to call back if symptoms continue/to speak with a nurse.   Patients mother states a medication should have been sent to the [facility] on [address] Not the [facility] [address], she would like it transferred over to [address].

## 2020-08-16 NOTE — PATIENT INSTRUCTIONS
Preventive Health Recommendations  Female Ages 21 - 39  Yearly exam:   See your health care provider every year in order to  1. Review health changes.  2. Discuss preventive care.    3. Review your medicines if you your provider has prescribed any.      You do not need a Pap test if your uterus was removed (hysterectomy) and you have not had cancer.    You should be tested each year for STIs (sexually transmitted diseases) if you're at risk.     Talk to your provider about how often to have your cholesterol checked, about every 5 years if normal.    If you are at risk for diabetes, you should have a diabetes test (fasting glucose).    Vitamin D deficiency screening and thyroid disease screening is also recommended every 3-5 years depending on risk factors or more often if symptomatic  PAP Smear:   Until age 30: Get a Pap test every three years (more often if you have had an abnormal result)    After age 30: Talk to your provider about whether you should have a Pap test every 3 years or have a Pap test with HPV screening every 5 years.   Shots: Get a flu shot each year. Get a tetanus shot every 10 years.   Nutrition:     Eat at least 5 servings of fruits and vegetables each day    Eat REAL food, stay away from processed foods.    Eat whole-grain bread, whole-wheat pasta and brown rice instead of white grains and rice.    For bone health:  Eat calcium-rich foods or take calcium pills (500 to 600 mg) twice a day with food (1200 mg per day). Also take vitamin D (2000 IUs) each day.     Lifestyle    Exercise regularly (30 minutes a day, 5 days of the week). This will help you control your weight and prevent disease.    Weight bearing exercise such as weight lifting, walking, running and yoga will be beneficial later in life preventing osteoporosis     Limit alcohol to one drink per day.    No smoking.     Wear sunscreen to prevent skin cancer.    See your dentist every six months to one year for an exam and cleaning  depending on their recommendation    Get an eye exam every two years or more often if you wear glasses or contacts.         yes

## 2020-10-23 ENCOUNTER — OFFICE VISIT (OUTPATIENT)
Dept: OBGYN | Facility: CLINIC | Age: 20
End: 2020-10-23
Payer: COMMERCIAL

## 2020-10-23 VITALS
DIASTOLIC BLOOD PRESSURE: 60 MMHG | SYSTOLIC BLOOD PRESSURE: 94 MMHG | HEART RATE: 58 BPM | BODY MASS INDEX: 20.77 KG/M2 | WEIGHT: 121 LBS

## 2020-10-23 DIAGNOSIS — Z11.3 SCREEN FOR STD (SEXUALLY TRANSMITTED DISEASE): ICD-10-CM

## 2020-10-23 DIAGNOSIS — Z11.8 SCREENING FOR CHLAMYDIAL DISEASE: Primary | ICD-10-CM

## 2020-10-23 PROCEDURE — 86696 HERPES SIMPLEX TYPE 2 TEST: CPT | Performed by: NURSE PRACTITIONER

## 2020-10-23 PROCEDURE — 86695 HERPES SIMPLEX TYPE 1 TEST: CPT | Performed by: NURSE PRACTITIONER

## 2020-10-23 PROCEDURE — 87491 CHLMYD TRACH DNA AMP PROBE: CPT | Performed by: NURSE PRACTITIONER

## 2020-10-23 PROCEDURE — 87389 HIV-1 AG W/HIV-1&-2 AB AG IA: CPT | Performed by: NURSE PRACTITIONER

## 2020-10-23 PROCEDURE — 86780 TREPONEMA PALLIDUM: CPT | Mod: 90 | Performed by: NURSE PRACTITIONER

## 2020-10-23 PROCEDURE — 36415 COLL VENOUS BLD VENIPUNCTURE: CPT | Performed by: NURSE PRACTITIONER

## 2020-10-23 PROCEDURE — 99000 SPECIMEN HANDLING OFFICE-LAB: CPT | Performed by: NURSE PRACTITIONER

## 2020-10-23 PROCEDURE — 87591 N.GONORRHOEAE DNA AMP PROB: CPT | Performed by: NURSE PRACTITIONER

## 2020-10-23 PROCEDURE — 99212 OFFICE O/P EST SF 10 MIN: CPT | Performed by: NURSE PRACTITIONER

## 2020-10-23 RX ORDER — HYDROXYZINE HYDROCHLORIDE 25 MG/1
25 TABLET, FILM COATED ORAL DAILY
COMMUNITY
Start: 2020-07-06 | End: 2024-08-06

## 2020-10-23 NOTE — PROGRESS NOTES
SUBJECTIVE:                                                   Mina Chavarria is a 20 year old female who presents to clinic today for the following health issue(s):  Patient presents with:  Follow Up: STD screening      Additional information: Pt works at [employer] pt donated blood and Employer called her and told her she tested positive for syphilis and HLA    HPI:  Pt here today requesting STD testing.   She had some screening blood done at work to be able to donate platelets. She was altered to her test results being positive for syphilis and HLA antigen: does not know which strain.    She physically feels well.   No new partners.   Last STD testing in 2019 was completely negative.   In terms of the HLA antigen, she does not have any autoimmune dx, and nothing in her family.     No LMP recorded. (Menstrual status: IUD)..     Patient is sexually active, .  Using IUD for contraception.    reports that she has been smoking. She has never used smokeless tobacco.    STD testing offered?  Accepted    Health maintenance updated:  no    Today's PHQ-2 Score:   PHQ-2 (  [employer]) 2019   Q1: Little interest or pleasure in doing things 0   Q2: Feeling down, depressed or hopeless 0   PHQ-2 Score 0     Today's PHQ-9 Score:   PHQ-9 SCORE 5/3/2019   PHQ-9 Total Score -   PHQ-9 Total Score 0     Today's JAMILA-7 Score:   JAMILA-7 SCORE 5/3/2019   Total Score -   Total Score 0       Problem list and histories reviewed & adjusted, as indicated.  Additional history: as documented.    Patient Active Problem List   Diagnosis     Herpes simplex     Major depressive disorder, recurrent episode, severe (H)     Anxiety disorder     IUD check up     Constipation     Recurrent UTI     Attention deficit disorder (ADD) without hyperactivity     H/O self-harm     Past Surgical History:   Procedure Laterality Date     HC EXC BENIGN SKIN LESION FACE/EARS <=0.5 CM  infant      Social History     Tobacco Use      Smoking status: Light Tobacco Smoker     Smokeless tobacco: Never Used     Tobacco comment: smokes rarely   Substance Use Topics     Alcohol use: Yes     Alcohol/week: 0.0 standard drinks      Problem (# of Occurrences) Relation (Name,Age of Onset)    Cancer (1) Maternal Grandmother: rectal cancer    Gynecology (1) Mother: endometrial hyperplasia, s/p hyterectomy    No Known Problems (6) Father, Sister, Brother, Maternal Grandfather, Paternal Grandmother, Other            Current Outpatient Medications   Medication Sig     buPROPion (WELLBUTRIN XL) 300 MG 24 hr tablet Take 1 tablet (300 mg) by mouth every morning     citalopram (CELEXA) 40 MG tablet Take 1 tablet (40 mg) by mouth daily     gabapentin (NEURONTIN) 300 MG capsule Take 300 mg by mouth 2 times daily      hydrOXYzine (ATARAX) 25 MG tablet Take 25 mg by mouth daily     levonorgestrel (MIRENA) 20 MCG/24HR IUD 1 each by Intrauterine route once     valACYclovir (VALTREX) 500 MG tablet TAKE 1 TABLET BY MOUTH DAILY . DOUBLE UP DURING AN OUTBREAK     No current facility-administered medications for this visit.      Allergies   Allergen Reactions     Hydrocodone Nausea and Vomiting     Oxycodone Nausea and Vomiting       ROS:  12 point review of systems negative other than symptoms noted below or in the HPI.  No urinary frequency or dysuria, bladder or kidney problems      OBJECTIVE:     BP 94/60   Pulse 58   Wt 54.9 kg (121 lb)   BMI 20.77 kg/m    Body mass index is 20.77 kg/m .    Exam:  Constitutional:  Appearance: Well nourished, well developed alert, in no acute distress  Psychiatric:  Mentation appears normal and affect normal/bright.     In-Clinic Test Results:  No results found for this or any previous visit (from the past 24 hour(s)).    ASSESSMENT/PLAN:                                                        ICD-10-CM    1. Screening for chlamydial disease  Z11.8 Chlamydia trachomatis PCR   2. Screen for STD (sexually transmitted disease)  Z11.3  Herpes Simplex Virus 1 and 2 IgG     HIV Antigen Antibody Combo     Neisseria gonorrhoeae PCR     Treponema Abs w Reflex to RPR and Titer       There are no Patient Instructions on file for this visit.    Full STD Testing. HLA discussed. Will monitor closely as she would be higher risk of autoimmune.pt will call if she can figure out which strain of HLA she was + for.     5 minutes in face to face counseling.     MIGUEL ANGEL Oneal CNP  [facility] [place]

## 2020-10-24 LAB
HIV 1+2 AB+HIV1 P24 AG SERPL QL IA: NONREACTIVE
T PALLIDUM AB SER QL: NONREACTIVE

## 2020-10-26 LAB
HSV1 IGG SERPL QL IA: 2.6 AI (ref 0–0.8)
HSV2 IGG SERPL QL IA: <0.2 AI (ref 0–0.8)

## 2021-02-04 DIAGNOSIS — B00.9 HSV-1 INFECTION: ICD-10-CM

## 2021-02-04 RX ORDER — VALACYCLOVIR HYDROCHLORIDE 500 MG/1
TABLET, FILM COATED ORAL
Qty: 180 TABLET | Refills: 0 | Status: SHIPPED | OUTPATIENT
Start: 2021-02-04 | End: 2021-10-25

## 2021-02-04 NOTE — TELEPHONE ENCOUNTER
"Requested Prescriptions   Pending Prescriptions Disp Refills     valACYclovir (VALTREX) 500 MG tablet 180 tablet 0     Sig: TAKE 1 TABLET BY MOUTH DAILY . DOUBLE UP DURING AN OUTBREAK       Antivirals for Herpes Protocol Failed - 2/4/2021  2:04 PM        Failed - Normal serum creatinine on file in past 12 months     Recent Labs   Lab Test 11/29/19  1931   CR 0.68       Ok to refill medication if creatinine is low          Passed - Patient is age 12 or older        Passed - Recent (12 mo) or future (30 days) visit within the authorizing provider's specialty     Patient has had an office visit with the authorizing provider or a provider within the authorizing providers department within the previous 12 mos or has a future within next 30 days. See \"Patient Info\" tab in inbasket, or \"Choose Columns\" in Meds & Orders section of the refill encounter.              Passed - Medication is active on med list           Last Written Prescription Date:  11/25/19  Last Fill Quantity: 180,  # refills: 0   Last office visit: 10/23/2020 with prescribing provider:  Brenna Jenkins   Future Office Visit:  none  Refill approved  Betty Chowdhury RN on 2/4/2021 at 2:40 PM          "

## 2021-10-23 DIAGNOSIS — B00.9 HSV-1 INFECTION: ICD-10-CM

## 2021-10-25 RX ORDER — VALACYCLOVIR HYDROCHLORIDE 500 MG/1
TABLET, FILM COATED ORAL
Qty: 30 TABLET | Refills: 0 | Status: SHIPPED | OUTPATIENT
Start: 2021-10-25 | End: 2021-11-30

## 2021-10-25 NOTE — TELEPHONE ENCOUNTER
"Requested Prescriptions   Pending Prescriptions Disp Refills     valACYclovir (VALTREX) 500 MG tablet [Pharmacy Med Name: VALACYCLOVIR  MG TABLET] 30 tablet 5     Sig: TAKE 1 TABLET BY MOUTH DAILY . DOUBLE UP DURING AN OUTBREAK       Antivirals for Herpes Protocol Failed - 10/23/2021  8:41 AM        Failed - Normal serum creatinine on file in past 12 months     Recent Labs   Lab Test 11/29/19  1931   CR 0.68       Ok to refill medication if creatinine is low          Passed - Patient is age 12 or older        Passed - Recent (12 mo) or future (30 days) visit within the authorizing provider's specialty     Patient has had an office visit with the authorizing provider or a provider within the authorizing providers department within the previous 12 mos or has a future within next 30 days. See \"Patient Info\" tab in inbasket, or \"Choose Columns\" in Meds & Orders section of the refill encounter.              Passed - Medication is active on med list           Refill approved, Appointment needed for further refills  Betty Chowdhury RN on 10/25/2021 at 10:36 AM    "

## 2021-11-04 DIAGNOSIS — B00.9 HSV-1 INFECTION: ICD-10-CM

## 2021-11-04 RX ORDER — VALACYCLOVIR HYDROCHLORIDE 500 MG/1
TABLET, FILM COATED ORAL
Qty: 180 TABLET | Refills: 1 | OUTPATIENT
Start: 2021-11-04

## 2021-11-04 NOTE — TELEPHONE ENCOUNTER
"Requested Prescriptions   Pending Prescriptions Disp Refills     valACYclovir (VALTREX) 500 MG tablet [Pharmacy Med Name: VALACYCLOVIR  MG TABLET] 180 tablet 1     Sig: TAKE 1 TABLET BY MOUTH DAILY . DOUBLE UP DURING AN OUTBREAK       Antivirals for Herpes Protocol Failed - 11/4/2021  1:30 PM        Failed - Recent (12 mo) or future (30 days) visit within the authorizing provider's specialty     Patient has had an office visit with the authorizing provider or a provider within the authorizing providers department within the previous 12 mos or has a future within next 30 days. See \"Patient Info\" tab in inbasket, or \"Choose Columns\" in Meds & Orders section of the refill encounter.              Failed - Normal serum creatinine on file in past 12 months     Recent Labs   Lab Test 11/29/19  1931   CR 0.68       Ok to refill medication if creatinine is low          Passed - Patient is age 12 or older        Passed - Medication is active on med list         Last Written Prescription Date:  10/25/21  Last Fill Quantity: 30,  # refills: 0   Last office visit: 10/23/2020 with prescribing provider:  Alice   Future Office Visit:      Pt due for annual, no appt scheduled. Pt already received one month extension. Rx denied.   Reta Coleman RN on 11/4/2021 at 1:35 PM        "

## 2021-11-29 DIAGNOSIS — B00.9 HSV-1 INFECTION: ICD-10-CM

## 2021-11-29 RX ORDER — VALACYCLOVIR HYDROCHLORIDE 500 MG/1
TABLET, FILM COATED ORAL
Qty: 30 TABLET | Refills: 0 | OUTPATIENT
Start: 2021-11-29

## 2021-11-29 NOTE — TELEPHONE ENCOUNTER
"Requested Prescriptions   Pending Prescriptions Disp Refills     valACYclovir (VALTREX) 500 MG tablet [Pharmacy Med Name: VALACYCLOVIR  MG TABLET] 30 tablet 0     Sig: TAKE 1 TABLET BY MOUTH DAILY . DOUBLE UP DURING AN OUTBREAK       Antivirals for Herpes Protocol Failed - 11/29/2021  9:09 AM        Failed - Recent (12 mo) or future (30 days) visit within the authorizing provider's specialty     Patient has had an office visit with the authorizing provider or a provider within the authorizing providers department within the previous 12 mos or has a future within next 30 days. See \"Patient Info\" tab in inbasket, or \"Choose Columns\" in Meds & Orders section of the refill encounter.              Failed - Normal serum creatinine on file in past 12 months     Recent Labs   Lab Test 11/29/19 1931   CR 0.68       Ok to refill medication if creatinine is low          Passed - Patient is age 12 or older        Passed - Medication is active on med list           Last Written Prescription Date:  10/25/21  Last Fill Quantity: 30,  # refills: 0   Last office visit: 10/23/2020 with prescribing provider:  Brenna Jenkins   Future Office Visit: none  Appointment needed for further refills   Betty Chowdhury RN on 11/29/2021 at 9:14 AM      "

## 2021-11-30 ENCOUNTER — TELEPHONE (OUTPATIENT)
Dept: OBGYN | Facility: CLINIC | Age: 21
End: 2021-11-30
Payer: COMMERCIAL

## 2021-11-30 DIAGNOSIS — B00.9 HSV-1 INFECTION: ICD-10-CM

## 2021-11-30 RX ORDER — VALACYCLOVIR HYDROCHLORIDE 500 MG/1
TABLET, FILM COATED ORAL
Qty: 30 TABLET | Refills: 0 | Status: SHIPPED | OUTPATIENT
Start: 2021-11-30 | End: 2021-12-03

## 2021-11-30 NOTE — TELEPHONE ENCOUNTER
Patient just scheduled her Annual for 12/3. She needs us to call in her Valcyclovir script today please

## 2021-12-03 ENCOUNTER — OFFICE VISIT (OUTPATIENT)
Dept: OBGYN | Facility: CLINIC | Age: 21
End: 2021-12-03
Payer: COMMERCIAL

## 2021-12-03 VITALS
HEART RATE: 76 BPM | HEIGHT: 64 IN | DIASTOLIC BLOOD PRESSURE: 64 MMHG | BODY MASS INDEX: 20.08 KG/M2 | SYSTOLIC BLOOD PRESSURE: 108 MMHG | WEIGHT: 117.6 LBS

## 2021-12-03 DIAGNOSIS — Z11.8 SCREENING FOR CHLAMYDIAL DISEASE: ICD-10-CM

## 2021-12-03 DIAGNOSIS — Z01.419 ENCOUNTER FOR GYNECOLOGICAL EXAMINATION WITHOUT ABNORMAL FINDING: Primary | ICD-10-CM

## 2021-12-03 DIAGNOSIS — Z11.3 SCREEN FOR STD (SEXUALLY TRANSMITTED DISEASE): ICD-10-CM

## 2021-12-03 DIAGNOSIS — Z23 NEED FOR PROPHYLACTIC VACCINATION AND INOCULATION AGAINST INFLUENZA: ICD-10-CM

## 2021-12-03 DIAGNOSIS — B00.9 HSV-1 INFECTION: ICD-10-CM

## 2021-12-03 DIAGNOSIS — Z23 NEED FOR TDAP VACCINATION: ICD-10-CM

## 2021-12-03 DIAGNOSIS — N63.10 LUMP OF RIGHT BREAST: ICD-10-CM

## 2021-12-03 PROCEDURE — 99395 PREV VISIT EST AGE 18-39: CPT | Mod: 25 | Performed by: NURSE PRACTITIONER

## 2021-12-03 PROCEDURE — 87491 CHLMYD TRACH DNA AMP PROBE: CPT | Performed by: NURSE PRACTITIONER

## 2021-12-03 PROCEDURE — 87591 N.GONORRHOEAE DNA AMP PROB: CPT | Performed by: NURSE PRACTITIONER

## 2021-12-03 PROCEDURE — 90472 IMMUNIZATION ADMIN EACH ADD: CPT | Performed by: NURSE PRACTITIONER

## 2021-12-03 PROCEDURE — 90686 IIV4 VACC NO PRSV 0.5 ML IM: CPT | Performed by: NURSE PRACTITIONER

## 2021-12-03 PROCEDURE — 90471 IMMUNIZATION ADMIN: CPT | Performed by: NURSE PRACTITIONER

## 2021-12-03 PROCEDURE — G0145 SCR C/V CYTO,THINLAYER,RESCR: HCPCS | Performed by: NURSE PRACTITIONER

## 2021-12-03 PROCEDURE — 90715 TDAP VACCINE 7 YRS/> IM: CPT | Performed by: NURSE PRACTITIONER

## 2021-12-03 RX ORDER — VALACYCLOVIR HYDROCHLORIDE 500 MG/1
TABLET, FILM COATED ORAL
Qty: 90 TABLET | Refills: 4 | Status: SHIPPED | OUTPATIENT
Start: 2021-12-03 | End: 2023-01-04

## 2021-12-03 ASSESSMENT — ANXIETY QUESTIONNAIRES
5. BEING SO RESTLESS THAT IT IS HARD TO SIT STILL: NOT AT ALL
2. NOT BEING ABLE TO STOP OR CONTROL WORRYING: NOT AT ALL
IF YOU CHECKED OFF ANY PROBLEMS ON THIS QUESTIONNAIRE, HOW DIFFICULT HAVE THESE PROBLEMS MADE IT FOR YOU TO DO YOUR WORK, TAKE CARE OF THINGS AT HOME, OR GET ALONG WITH OTHER PEOPLE: NOT DIFFICULT AT ALL
7. FEELING AFRAID AS IF SOMETHING AWFUL MIGHT HAPPEN: NOT AT ALL
GAD7 TOTAL SCORE: 1
1. FEELING NERVOUS, ANXIOUS, OR ON EDGE: NOT AT ALL
3. WORRYING TOO MUCH ABOUT DIFFERENT THINGS: NOT AT ALL
6. BECOMING EASILY ANNOYED OR IRRITABLE: SEVERAL DAYS

## 2021-12-03 ASSESSMENT — MIFFLIN-ST. JEOR: SCORE: 1265.49

## 2021-12-03 ASSESSMENT — PATIENT HEALTH QUESTIONNAIRE - PHQ9: 5. POOR APPETITE OR OVEREATING: NOT AT ALL

## 2021-12-03 NOTE — PROGRESS NOTES
Mina is a 21 year old  female who presents for annual exam.     Besides routine health maintenance,  she would like to discuss getting a referral for a PCP.    HPI:  The patient's PCP is Amanda Arce MD. patient here today for her annual GYN exam and she is requesting a Pap smear.  She has a Mirena IUD in place and is amenorrheic on the method.  She accepts STD testing today.    She is due for her flu and Tdap vaccine.    She is stressed as her boyfriend has been overseas serving.  He will be home in .      GYNECOLOGIC HISTORY:    No LMP recorded. (Menstrual status: IUD).    Regular menses? No, has Mirena IUD    Her current contraception method is: IUD.  She  reports that she has quit smoking. She has never used smokeless tobacco.    Patient is not sexually active.  STD testing offered?  Accepted  Last PHQ-9 score on record =   PHQ-9 SCORE 12/3/2021   PHQ-9 Total Score -   PHQ-9 Total Score 5     Last GAD7 score on record =   JAMILA-7 SCORE 12/3/2021   Total Score -   Total Score 1     Alcohol Score = 8    HEALTH MAINTENANCE:  Cholesterol: (No results found for: CHOL  Last Mammo: Not applicable, Result: Not applicable, Next Mammo: Due at age 38  Pap:   Lab Results   Component Value Date    PAP NIL 2019     Colonoscopy:  N/a, Result: Not applicable, Next Colonoscopy: Age 48.  Dexa:  N/a    Health maintenance updated:  yes    HISTORY:  OB History    Para Term  AB Living   0 0 0 0 0 0   SAB IAB Ectopic Multiple Live Births   0 0 0 0 0       Patient Active Problem List   Diagnosis     Herpes simplex     Major depressive disorder, recurrent episode, severe (H)     Anxiety disorder     IUD check up     Constipation     Recurrent UTI     Attention deficit disorder (ADD) without hyperactivity     H/O self-harm     Past Surgical History:   Procedure Laterality Date     HC EXC BENIGN SKIN LESION FACE/EARS <=0.5 CM  infant      Social History     Tobacco Use     Smoking  "status: Former Smoker     Smokeless tobacco: Never Used     Tobacco comment: smoked rarely   Substance Use Topics     Alcohol use: Yes     Alcohol/week: 0.0 standard drinks      Problem (# of Occurrences) Relation (Name,Age of Onset)    Cancer (1) Maternal Grandmother: rectal cancer    Gynecology (1) Mother: endometrial hyperplasia, s/p hyterectomy    No Known Problems (6) Father, Sister, Brother, Maternal Grandfather, Paternal Grandmother, Other            Current Outpatient Medications   Medication Sig     buPROPion (WELLBUTRIN XL) 300 MG 24 hr tablet Take 1 tablet (300 mg) by mouth every morning     citalopram (CELEXA) 40 MG tablet Take 1 tablet (40 mg) by mouth daily     gabapentin (NEURONTIN) 300 MG capsule Take 300 mg by mouth 2 times daily      hydrOXYzine (ATARAX) 25 MG tablet Take 25 mg by mouth daily     levonorgestrel (MIRENA) 20 MCG/24HR IUD 1 each by Intrauterine route once     valACYclovir (VALTREX) 500 MG tablet Take one tablet by mouth daily. Double up during an outbreak     No current facility-administered medications for this visit.     Allergies   Allergen Reactions     Hydrocodone Nausea and Vomiting     Oxycodone Nausea and Vomiting       Past medical, surgical, social and family histories were reviewed and updated in EPIC.    ROS:   12 point review of systems negative other than symptoms noted below or in the HPI.  No urinary frequency or dysuria, bladder or kidney problems    EXAM:  /64   Pulse 76   Ht 1.613 m (5' 3.5\")   Wt 53.3 kg (117 lb 9.6 oz)   Breastfeeding No   BMI 20.51 kg/m     BMI: Body mass index is 20.51 kg/m .    PHYSICAL EXAM:  Constitutional:   Appearance: Well nourished, well developed, alert, in no acute distress  Neck:  Lymph Nodes:  No lymphadenopathy present    Thyroid:  Gland size normal, nontender, no nodules or masses present  on palpation  Chest:  Respiratory Effort:  Breathing unlabored  Cardiovascular:    Heart: Auscultation:  Regular rate, normal rhythm, " no murmurs present  Breasts: Inspection of Breasts:  No lymphadenopathy present., Axillary Lymph Nodes:  No lymphadenopathy present., No nodularity, asymmetry or nipple discharge bilaterally. and Left breast palpation is normal.  She has a mobile round 1 cm nodule at the 9 o'clock position on the areola of the right breast.  Gastrointestinal:   Abdominal Examination:  Abdomen nontender to palpation, tone normal without rigidity or guarding, no masses present, umbilicus without lesions   Liver and Spleen:  No hepatomegaly present, liver nontender to palpation    Hernias:  No hernias present  Lymphatic: Lymph Nodes:  No other lymphadenopathy present  Skin:  General Inspection:  No rashes present, no lesions present, no areas of  discoloration  Neurologic:    Mental Status:  Oriented X3.  Normal strength and tone, sensory exam                grossly normal, mentation intact and speech normal.    Psychiatric:   Mentation appears normal and affect normal/bright.         Pelvic Exam:  External Genitalia:     Normal appearance for age, no discharge present, no tenderness present, no inflammatory lesions present, color normal  Vagina:    Normal vaginal vault without central or paravaginal defects, no discharge present, no inflammatory lesions present, no masses present  Bladder:     Nontender to palpation  Urethra:   Urethral Body:  Urethra palpation normal, urethra structural support normal   Urethral Meatus:  No erythema or lesions present  Cervix:     Appearance healthy, no lesions present, nontender to palpation, no bleeding present, string present  Uterus:     Nontender to palpation, no masses present, position anteflexed, mobility: normal  Adnexa:     No adnexal tenderness present, no adnexal masses present  Perineum:     Perineum within normal limits, no evidence of trauma, no rashes or skin lesions present  Anus:     Anus within normal limits, no hemorrhoids present  Inguinal Lymph Nodes:     No lymphadenopathy  present  Pubic Hair:     Normal pubic hair distribution for age  Genitalia and Groin:     No rashes present, no lesions present, no areas of discoloration, no masses present      COUNSELING:   Special attention given to:        Regular exercise       Healthy diet/nutrition       Immunizations    Vaccinated for: Influenza and TDAP          BMI: Body mass index is 20.51 kg/m .      ASSESSMENT:  21 year old female with satisfactory annual exam.    ICD-10-CM    1. Encounter for gynecological examination without abnormal finding  Z01.419 Pap screen only - recommended age 21 - 24 years   2. Screening for chlamydial disease  Z11.8 Chlamydia trachomatis PCR   3. Screen for STD (sexually transmitted disease)  Z11.3 Neisseria gonorrhoeae PCR   4. Lump of right breast  N63.10 US Breast Right Limited 1-3 Quadrants   5. HSV-1 infection  B00.9 valACYclovir (VALTREX) 500 MG tablet   6. Need for prophylactic vaccination and inoculation against influenza  Z23 INFLUENZA VACCINE IM > 6 MONTHS VALENT IIV4 (AFLURIA/FLUZONE)   7. Need for Tdap vaccination  Z23 TDAP VACCINE (Adacel, Boostrix)  [[id number]]       PLAN:  Thin 21-year-old female with a right palpable breast lump.  We will order a breast ultrasound and follow-up as needed.  STD testing was completed.  Her Pap smear was collected and if it is normal she can repeat in 1 year.    MIGUEL ANGEL Oneal CNP

## 2021-12-04 LAB
C TRACH DNA SPEC QL NAA+PROBE: NEGATIVE
N GONORRHOEA DNA SPEC QL NAA+PROBE: NEGATIVE

## 2021-12-04 ASSESSMENT — ANXIETY QUESTIONNAIRES: GAD7 TOTAL SCORE: 1

## 2021-12-04 ASSESSMENT — PATIENT HEALTH QUESTIONNAIRE - PHQ9: SUM OF ALL RESPONSES TO PHQ QUESTIONS 1-9: 5

## 2021-12-07 LAB
BKR LAB AP GYN ADEQUACY: NORMAL
BKR LAB AP GYN INTERPRETATION: NORMAL
BKR LAB AP HPV REFLEX: NO
BKR LAB AP PREVIOUS ABNORMAL: NORMAL
PATH REPORT.COMMENTS IMP SPEC: NORMAL
PATH REPORT.COMMENTS IMP SPEC: NORMAL
PATH REPORT.RELEVANT HX SPEC: NORMAL

## 2021-12-15 ENCOUNTER — HOSPITAL ENCOUNTER (OUTPATIENT)
Dept: MAMMOGRAPHY | Facility: CLINIC | Age: 21
End: 2021-12-15
Attending: NURSE PRACTITIONER
Payer: COMMERCIAL

## 2021-12-15 DIAGNOSIS — N63.10 LUMP OF RIGHT BREAST: ICD-10-CM

## 2021-12-15 PROCEDURE — 76642 ULTRASOUND BREAST LIMITED: CPT | Mod: RT

## 2021-12-27 ENCOUNTER — HOSPITAL ENCOUNTER (OUTPATIENT)
Dept: MAMMOGRAPHY | Facility: CLINIC | Age: 21
End: 2021-12-27
Attending: NURSE PRACTITIONER
Payer: COMMERCIAL

## 2021-12-27 DIAGNOSIS — N63.10 LUMP OF RIGHT BREAST: ICD-10-CM

## 2021-12-27 PROCEDURE — 272N000031 US BREAST BIOPSY CORE NEEDLE RIGHT

## 2021-12-27 PROCEDURE — 88305 TISSUE EXAM BY PATHOLOGIST: CPT | Mod: TC | Performed by: NURSE PRACTITIONER

## 2021-12-27 PROCEDURE — 250N000009 HC RX 250: Performed by: NURSE PRACTITIONER

## 2021-12-27 RX ADMIN — LIDOCAINE HYDROCHLORIDE 5 ML: 10 INJECTION, SOLUTION INFILTRATION; PERINEURAL at 14:11

## 2021-12-27 NOTE — DISCHARGE INSTRUCTIONS

## 2021-12-28 ENCOUNTER — TELEPHONE (OUTPATIENT)
Dept: MAMMOGRAPHY | Facility: CLINIC | Age: 21
End: 2021-12-28

## 2021-12-28 LAB
PATH REPORT.COMMENTS IMP SPEC: NORMAL
PATH REPORT.COMMENTS IMP SPEC: NORMAL
PATH REPORT.FINAL DX SPEC: NORMAL
PATH REPORT.GROSS SPEC: NORMAL
PATH REPORT.MICROSCOPIC SPEC OTHER STN: NORMAL
PATH REPORT.RELEVANT HX SPEC: NORMAL
PHOTO IMAGE: NORMAL

## 2021-12-28 PROCEDURE — 88305 TISSUE EXAM BY PATHOLOGIST: CPT | Mod: 26 | Performed by: PATHOLOGY

## 2021-12-28 NOTE — TELEPHONE ENCOUNTER
After review by [facility] Radiologist, Dr. Manpreet Reyes, Ms. Chavarria was called,  verified, and given her 2021 Right Breast Biopsy results (Benign Breast Tissue) and recommended Follow up (Clinical).  Biopsy site without issue or concern.  I encouraged her to contact her doctor with any further breast concerns.    Final Diagnosis   Right breast, 9 o'clock position and 1 cm from the nipple, 1.4 cm lesion, low suspicion, ultrasound-guided core biopsies:  -Benign breast tissue with increased fibrous component and no other specific lesion present; no evidence of fibroadenoma      Electronically signed by Shaila Manuel MD on 2021 at 11:59 AM

## 2022-04-05 NOTE — PROGRESS NOTES
Please call Mina with negative gonorrhea and chlamydia tests. Hope she is feeling better!    Amanda Arce MD  [facility]  March 23, 2017   Cardiac

## 2022-11-17 NOTE — NURSING NOTE
Addended by: BOCO, ULYSSES on: 11/17/2022 10:10 AM     Modules accepted: Orders, Level of Service     Prior to immunization administration, verified patients identity using patient s name and date of birth. Please see Immunization Activity for additional information.     Screening Questionnaire for Adult Immunization    Are you sick today?   No   Do you have allergies to medications, food, a vaccine component or latex?   No   Have you ever had a serious reaction after receiving a vaccination?   No   Do you have a long-term health problem with heart, lung, kidney, or metabolic disease (e.g., diabetes), asthma, a blood disorder, no spleen, complement component deficiency, a cochlear implant, or a spinal fluid leak?  Are you on long-term aspirin therapy?   No   Do you have cancer, leukemia, HIV/AIDS, or any other immune system problem?   No   Do you have a parent, brother, or sister with an immune system problem?   No   In the past 3 months, have you taken medications that affect  your immune system, such as prednisone, other steroids, or anticancer drugs; drugs for the treatment of rheumatoid arthritis, Crohn s disease, or psoriasis; or have you had radiation treatments?   No   Have you had a seizure, or a brain or other nervous system problem?   No   During the past year, have you received a transfusion of blood or blood    products, or been given immune (gamma) globulin or antiviral drug?   No   For women: Are you pregnant or is there a chance you could become       pregnant during the next month?   No   Have you received any vaccinations in the past 4 weeks?   No     Immunization questionnaire answers were all negative.        Per orders of Brenna Jenkins, injection of Adacel and Flu given by Nela Sexton CMA. Patient instructed to remain in clinic for 15 minutes afterwards, and to report any adverse reaction to me immediately.       Screening performed by Nela Sexton CMA on 12/3/2021 at 10:38 AM.

## 2023-01-11 NOTE — PROGRESS NOTES
Mina is a 22 year old  female who presents for annual exam.     Besides routine health maintenance, she has no other health concerns today .    HPI:  The patient's PCP is  Amanda Arce MD. patient here today for her annual GYN exam.  She is also due for Pap smear.  She has an IUD in place and is amenorrheic on the method.    She had a right breast biopsy back in 2021 that was benign.  She has not palpated any other lumps.    She has questions while going off of her mental health medication if and when she decides to start a family.  She sees psychiatry and is due to see them in the next couple of months.    She unfortunately was just laid off from her job due to the company closing.  She is thinking about going back to school for radiology.      GYNECOLOGIC HISTORY:    No LMP recorded. (Menstrual status: IUD).    Her current contraception method is: IUD.  She  reports that she has quit smoking. She has never used smokeless tobacco.    Patient is sexually active.  STD testing offered?  Declined  Last PHQ-9 score on record =   PHQ-9 SCORE 2023   PHQ-9 Total Score -   PHQ-9 Total Score 2     Last GAD7 score on record =   JAMILA-7 SCORE 2023   Total Score -   Total Score 1     Alcohol Score = 7    HEALTH MAINTENANCE:  Cholesterol: (No results found for: CHOL   Last Mammo: Not applicable, Result: Not applicable, Next Mammo: Due at age 38   Pap: (  Lab Results   Component Value Date    GYNINTERP  2021     Negative for Intraepithelial Lesion or Malignancy (NILM)    PAP NIL 2019      Colonoscopy:  N/A, Result: Not applicable, Next Colonoscopy: age 43   Dexa:  N/A    Health maintenance updated:  yes    HISTORY:  OB History    Para Term  AB Living   0 0 0 0 0 0   SAB IAB Ectopic Multiple Live Births   0 0 0 0 0       Patient Active Problem List   Diagnosis     Herpes simplex     Major depressive disorder, recurrent episode, severe (H)     Anxiety disorder     IUD  "check up     Constipation     Recurrent UTI     Attention deficit disorder (ADD) without hyperactivity     H/O self-harm     Past Surgical History:   Procedure Laterality Date     ZZHC EXC BENIGN SKIN LESION FACE/EARS <=0.5 CM  infant      Social History     Tobacco Use     Smoking status: Former     Smokeless tobacco: Never     Tobacco comments:     smoked rarely   Substance Use Topics     Alcohol use: Yes     Alcohol/week: 0.0 standard drinks      Problem (# of Occurrences) Relation (Name,Age of Onset)    Cancer (1) Maternal Grandmother: rectal cancer    Gynecology (1) Mother: endometrial hyperplasia, s/p hyterectomy    No Known Problems (6) Father, Sister, Brother, Maternal Grandfather, Paternal Grandmother, Other            Current Outpatient Medications   Medication Sig     buPROPion (WELLBUTRIN XL) 300 MG 24 hr tablet Take 1 tablet (300 mg) by mouth every morning     citalopram (CELEXA) 40 MG tablet Take 1 tablet (40 mg) by mouth daily     levonorgestrel (MIRENA) 20 MCG/24HR IUD 1 each by Intrauterine route once     valACYclovir (VALTREX) 500 MG tablet Take 1 tablet (500 mg) by mouth daily     gabapentin (NEURONTIN) 300 MG capsule Take 300 mg by mouth 2 times daily  (Patient not taking: Reported on 1/12/2023)     hydrOXYzine (ATARAX) 25 MG tablet Take 25 mg by mouth daily (Patient not taking: Reported on 1/12/2023)     No current facility-administered medications for this visit.     Allergies   Allergen Reactions     Hydrocodone Nausea and Vomiting     Oxycodone Nausea and Vomiting       Past medical, surgical, social and family histories were reviewed and updated in EPIC.    ROS:   12 point review of systems negative other than symptoms noted below or in the HPI.  No urinary frequency or dysuria, bladder or kidney problems    EXAM:  /68   Ht 1.642 m (5' 4.65\")   Wt 57.5 kg (126 lb 12.8 oz)   BMI 21.33 kg/m     BMI: Body mass index is 21.33 kg/m .    PHYSICAL EXAM:  Constitutional:   Appearance: Well " nourished, well developed, alert, in no acute distress  Neck:  Lymph Nodes:  No lymphadenopathy present    Thyroid:  Gland size normal, nontender, no nodules or masses present  on palpation  Chest:  Respiratory Effort:  Breathing unlabored  Cardiovascular:    Heart: Auscultation:  Regular rate, normal rhythm, no murmurs present  Breasts: Inspection of Breasts:  No lymphadenopathy present., Palpation of Breasts and Axillae:  No masses present on palpation, no breast tenderness., Axillary Lymph Nodes:  No lymphadenopathy present. and No nodularity, asymmetry or nipple discharge bilaterally.  Gastrointestinal:   Abdominal Examination:  Abdomen nontender to palpation, tone normal without rigidity or guarding, no masses present, umbilicus without lesions   Liver and Spleen:  No hepatomegaly present, liver nontender to palpation    Hernias:  No hernias present  Lymphatic: Lymph Nodes:  No other lymphadenopathy present  Skin:  General Inspection:  No rashes present, no lesions present, no areas of  discoloration  Neurologic:    Mental Status:  Oriented X3.  Normal strength and tone, sensory exam                grossly normal, mentation intact and speech normal.    Psychiatric:   Mentation appears normal and affect normal/bright.         Pelvic Exam:  External Genitalia:     Normal appearance for age, no discharge present, no tenderness present, no inflammatory lesions present, color normal  Vagina:    Normal vaginal vault without central or paravaginal defects, no discharge present, no inflammatory lesions present, no masses present  Bladder:     Nontender to palpation  Urethra:   Urethral Body:  Urethra palpation normal, urethra structural support normal   Urethral Meatus:  No erythema or lesions present  Cervix:     Appearance healthy, no lesions present, nontender to palpation, no bleeding present, string present  Uterus:     Nontender to palpation, no masses present, position anteflexed, mobility: normal  Adnexa:      No adnexal tenderness present, no adnexal masses present  Perineum:     Perineum within normal limits, no evidence of trauma, no rashes or skin lesions present  Anus:     Anus within normal limits, no hemorrhoids present  Inguinal Lymph Nodes:     No lymphadenopathy present  Pubic Hair:     Normal pubic hair distribution for age  Genitalia and Groin:     No rashes present, no lesions present, no areas of discoloration, no masses present      COUNSELING:   Special attention given to:        Regular exercise       Healthy diet/nutrition       Contraception    BMI: Body mass index is 21.33 kg/m .      ASSESSMENT:  22 year old female with satisfactory annual exam.    ICD-10-CM    1. Encounter for gynecological examination without abnormal finding  Z01.419       2. Encounter for screening for cervical cancer  Z12.4 Pap thin layer screen only - recommended age 21 - 24 years      3. HSV-1 infection  B00.9 valACYclovir (VALTREX) 500 MG tablet      4. IUD check up  Z30.431           PLAN:  Thin 22-year-old female with a normal GYN exam.  Her IUD is in place.  Pap smear was collected and if it is normal she can repeat in 3 years.  Valtrex was refilled.  We encouraged her to discuss with psychiatry about med management if and when she decides to start a family.  At this time I see no  reason to change any medication as she states that they are a couple of years out from trying for pregnancy.    MIGUEL ANGEL Oneal CNP

## 2023-01-12 ENCOUNTER — OFFICE VISIT (OUTPATIENT)
Dept: OBGYN | Facility: CLINIC | Age: 23
End: 2023-01-12
Payer: COMMERCIAL

## 2023-01-12 VITALS
HEIGHT: 65 IN | DIASTOLIC BLOOD PRESSURE: 68 MMHG | BODY MASS INDEX: 21.13 KG/M2 | WEIGHT: 126.8 LBS | SYSTOLIC BLOOD PRESSURE: 102 MMHG

## 2023-01-12 DIAGNOSIS — Z01.419 ENCOUNTER FOR GYNECOLOGICAL EXAMINATION WITHOUT ABNORMAL FINDING: Primary | ICD-10-CM

## 2023-01-12 DIAGNOSIS — Z12.4 ENCOUNTER FOR SCREENING FOR CERVICAL CANCER: ICD-10-CM

## 2023-01-12 DIAGNOSIS — B00.9 HSV-1 INFECTION: ICD-10-CM

## 2023-01-12 DIAGNOSIS — Z30.431 IUD CHECK UP: ICD-10-CM

## 2023-01-12 PROCEDURE — G0145 SCR C/V CYTO,THINLAYER,RESCR: HCPCS | Performed by: NURSE PRACTITIONER

## 2023-01-12 PROCEDURE — 99395 PREV VISIT EST AGE 18-39: CPT | Performed by: NURSE PRACTITIONER

## 2023-01-12 RX ORDER — VALACYCLOVIR HYDROCHLORIDE 500 MG/1
500 TABLET, FILM COATED ORAL DAILY
Qty: 90 TABLET | Refills: 3 | Status: SHIPPED | OUTPATIENT
Start: 2023-01-12 | End: 2024-08-06

## 2023-01-12 ASSESSMENT — ANXIETY QUESTIONNAIRES
IF YOU CHECKED OFF ANY PROBLEMS ON THIS QUESTIONNAIRE, HOW DIFFICULT HAVE THESE PROBLEMS MADE IT FOR YOU TO DO YOUR WORK, TAKE CARE OF THINGS AT HOME, OR GET ALONG WITH OTHER PEOPLE: NOT DIFFICULT AT ALL
5. BEING SO RESTLESS THAT IT IS HARD TO SIT STILL: NOT AT ALL
GAD7 TOTAL SCORE: 1
7. FEELING AFRAID AS IF SOMETHING AWFUL MIGHT HAPPEN: NOT AT ALL
GAD7 TOTAL SCORE: 1
6. BECOMING EASILY ANNOYED OR IRRITABLE: NOT AT ALL
3. WORRYING TOO MUCH ABOUT DIFFERENT THINGS: NOT AT ALL
2. NOT BEING ABLE TO STOP OR CONTROL WORRYING: NOT AT ALL
1. FEELING NERVOUS, ANXIOUS, OR ON EDGE: SEVERAL DAYS

## 2023-01-12 ASSESSMENT — PATIENT HEALTH QUESTIONNAIRE - PHQ9
5. POOR APPETITE OR OVEREATING: NOT AT ALL
SUM OF ALL RESPONSES TO PHQ QUESTIONS 1-9: 2

## 2023-04-01 ENCOUNTER — HEALTH MAINTENANCE LETTER (OUTPATIENT)
Age: 23
End: 2023-04-01

## 2024-03-24 ENCOUNTER — HEALTH MAINTENANCE LETTER (OUTPATIENT)
Age: 24
End: 2024-03-24

## 2024-08-06 ENCOUNTER — OFFICE VISIT (OUTPATIENT)
Dept: FAMILY MEDICINE | Facility: CLINIC | Age: 24
End: 2024-08-06
Payer: COMMERCIAL

## 2024-08-06 VITALS
DIASTOLIC BLOOD PRESSURE: 84 MMHG | OXYGEN SATURATION: 98 % | RESPIRATION RATE: 18 BRPM | BODY MASS INDEX: 20.49 KG/M2 | HEART RATE: 74 BPM | SYSTOLIC BLOOD PRESSURE: 140 MMHG | WEIGHT: 123 LBS | TEMPERATURE: 98 F | HEIGHT: 65 IN

## 2024-08-06 DIAGNOSIS — G89.29 CHRONIC MIDLINE LOW BACK PAIN WITHOUT SCIATICA: ICD-10-CM

## 2024-08-06 DIAGNOSIS — Z00.00 ROUTINE GENERAL MEDICAL EXAMINATION AT A HEALTH CARE FACILITY: Primary | ICD-10-CM

## 2024-08-06 DIAGNOSIS — B35.1 ONYCHOMYCOSIS: ICD-10-CM

## 2024-08-06 DIAGNOSIS — M54.50 CHRONIC MIDLINE LOW BACK PAIN WITHOUT SCIATICA: ICD-10-CM

## 2024-08-06 LAB
ERYTHROCYTE [DISTWIDTH] IN BLOOD BY AUTOMATED COUNT: 11.1 % (ref 10–15)
HCT VFR BLD AUTO: 37.4 % (ref 35–47)
HGB BLD-MCNC: 12.3 G/DL (ref 11.7–15.7)
MCH RBC QN AUTO: 32.3 PG (ref 26.5–33)
MCHC RBC AUTO-ENTMCNC: 32.9 G/DL (ref 31.5–36.5)
MCV RBC AUTO: 98 FL (ref 78–100)
PLATELET # BLD AUTO: 337 10E3/UL (ref 150–450)
RBC # BLD AUTO: 3.81 10E6/UL (ref 3.8–5.2)
WBC # BLD AUTO: 5.9 10E3/UL (ref 4–11)

## 2024-08-06 PROCEDURE — 85027 COMPLETE CBC AUTOMATED: CPT | Performed by: NURSE PRACTITIONER

## 2024-08-06 PROCEDURE — 87101 SKIN FUNGI CULTURE: CPT | Performed by: NURSE PRACTITIONER

## 2024-08-06 PROCEDURE — 87107 FUNGI IDENTIFICATION MOLD: CPT | Performed by: NURSE PRACTITIONER

## 2024-08-06 PROCEDURE — 99385 PREV VISIT NEW AGE 18-39: CPT | Performed by: NURSE PRACTITIONER

## 2024-08-06 PROCEDURE — 36415 COLL VENOUS BLD VENIPUNCTURE: CPT | Performed by: NURSE PRACTITIONER

## 2024-08-06 PROCEDURE — 99214 OFFICE O/P EST MOD 30 MIN: CPT | Mod: 25 | Performed by: NURSE PRACTITIONER

## 2024-08-06 PROCEDURE — 82728 ASSAY OF FERRITIN: CPT | Performed by: NURSE PRACTITIONER

## 2024-08-06 PROCEDURE — 80053 COMPREHEN METABOLIC PANEL: CPT | Performed by: NURSE PRACTITIONER

## 2024-08-06 RX ORDER — BUSPIRONE HYDROCHLORIDE 10 MG/1
TABLET ORAL
COMMUNITY
Start: 2024-07-12

## 2024-08-06 SDOH — HEALTH STABILITY: PHYSICAL HEALTH: ON AVERAGE, HOW MANY DAYS PER WEEK DO YOU ENGAGE IN MODERATE TO STRENUOUS EXERCISE (LIKE A BRISK WALK)?: 3 DAYS

## 2024-08-06 ASSESSMENT — PATIENT HEALTH QUESTIONNAIRE - PHQ9
SUM OF ALL RESPONSES TO PHQ QUESTIONS 1-9: 1
10. IF YOU CHECKED OFF ANY PROBLEMS, HOW DIFFICULT HAVE THESE PROBLEMS MADE IT FOR YOU TO DO YOUR WORK, TAKE CARE OF THINGS AT HOME, OR GET ALONG WITH OTHER PEOPLE: SOMEWHAT DIFFICULT
SUM OF ALL RESPONSES TO PHQ QUESTIONS 1-9: 1

## 2024-08-06 ASSESSMENT — SOCIAL DETERMINANTS OF HEALTH (SDOH): HOW OFTEN DO YOU GET TOGETHER WITH FRIENDS OR RELATIVES?: ONCE A WEEK

## 2024-08-06 ASSESSMENT — PAIN SCALES - GENERAL: PAINLEVEL: NO PAIN (0)

## 2024-08-06 NOTE — PROGRESS NOTES
Preventive Care Visit  [facility]  MIGUEL ANGEL Williamson CNP, Family Medicine  Aug 6, 2024      Assessment & Plan     Routine general medical examination at a health care facility  next preventative care visit in one year.       Onychomycosis  -discussed treatment options and risk of oral antifungal medication and high rate of treatment failure. Will also screen for iron deficiency as cause of nail changes.   - Fungus Culture,  skin, hair, or nail  - Comprehensive metabolic panel  - Ferritin  - CBC with platelets  - terbinafine (LAMISIL) 250 MG tablet; Take 1 tablet (250 mg) by mouth daily for 42 days    Chronic midline low back pain without sciatica  -Discussed options for treatment, recommend trial of physical therapy first, if symptoms do not resolve or worsen can then consider imaging. Patient would like to think about physical therapy.   -Patient does not have any red flag symptoms including bowel or bladder incontinence, saddle anesthesia, or lower extremity weakness. Patient was instructed to go to the emergency department if he develops these symptoms.      Patient has been advised of split billing requirements and indicates understanding: Yes        Counseling  Appropriate preventive services were addressed with this patient via screening, questionnaire, or discussion as appropriate for fall prevention, nutrition, physical activity, Tobacco-use cessation, weight loss and cognition.  Checklist reviewing preventive services available has been given to the patient.  Reviewed patient's diet, addressing concerns and/or questions.   She is at risk for lack of exercise and has been provided with information to increase physical activity for the benefit of her well-being.   The patient was instructed to see the dentist every 6 months.   She is at risk for psychosocial distress and has been provided with information to reduce risk.   The patient reports drinking more than 3 alcoholic drinks  per day and/or more than 7 drhnks per week. The patient was counseled and given information about possible harmful effects of excessive alcohol intake.        Yolanda Enriquez is a 24 year old, presenting for the following:  Physical        8/6/2024     3:11 PM   Additional Questions   Roomed by Jackie LAUREANO   Accompanied by self        Health Care Directive  Patient does not have a Health Care Directive or Living Will: Discussed advance care planning with patient; however, patient declined at this time.    Patient here for yearly preventative care visit. In addition, they have the following concerns:    1. Lower back pain- flares up with walking. Works as a phlebotomists.  No issues with constipation or pelvic pain.       2. Nail fungus    [facility]              8/6/2024   General Health   How would you rate your overall physical health? (!) FAIR   Feel stress (tense, anxious, or unable to sleep) Only a little      (!) STRESS CONCERN      8/6/2024   Nutrition   Three or more servings of calcium each day? (!) NO   Diet: Regular (no restrictions)   How many servings of fruit and vegetables per day? (!) I DON'T KNOW   How many sweetened beverages each day? 0-1            8/6/2024   Exercise   Days per week of moderate/strenous exercise 3 days            8/6/2024   Social Factors   Frequency of gathering with friends or relatives Once a week   Worry food won't last until get money to buy more No   Food not last or not have enough money for food? No   Do you have housing? (Housing is defined as stable permanent housing and does not include staying ouside in a car, in a tent, in an abandoned building, in an overnight shelter, or couch-surfing.) Yes   Are you worried about losing your housing? No   Lack of transportation? No   Unable to get utilities (heat,electricity)? No            8/6/2024   Dental   Dentist two times every year? (!) NO            8/6/2024   TB Screening   Were you born outside of the  [place]? No          Today's PHQ-9 Score:       8/6/2024     3:06 PM   PHQ-9 SCORE   PHQ-9 Total Score MyChart 1 (Minimal depression)   PHQ-9 Total Score 1         8/6/2024   Substance Use   Alcohol more than 3/day or more than 7/wk Yes   How often do you have a drink containing alcohol 2 to 3 times a week   How many alcohol drinks on typical day 3 or 4   How often do you have 5+ drinks at one occasion Monthly   Audit 2/3 Score 3   How often not able to stop drinking once started Never   How often failed to do what normally expected Never   How often needed first drink in am after a heavy drinking session Never   How often feeling of guilt or remorse after drinking Never   How often unable to remember what happened the night before Never   Have you or someone else been injured because of your drinking No   Has anyone been concerned or suggested you cut down on drinking No   TOTAL SCORE - AUDIT 6   Do you use any other substances recreationally? No        Social History     Tobacco Use    Smoking status: Former    Smokeless tobacco: Never    Tobacco comments:     smoked rarely   Vaping Use    Vaping status: Every Day    Substances: Nicotine, Flavoring    Devices: Disposable   Substance Use Topics    Alcohol use: Yes     Alcohol/week: 0.0 standard drinks of alcohol    Drug use: No     Comment:  6/27/12   8/13/12 2/7/2014 cc           12/15/2021   LAST FHS-7 RESULTS   1st degree relative breast or ovarian cancer Yes   Any relative bilateral breast cancer No   Any male have breast cancer No   Any ONE woman have BOTH breast AND ovarian cancer Yes   Any woman with breast cancer before 50yrs No   2 or more relatives with breast AND/OR ovarian cancer No   2 or more relatives with breast AND/OR bowel cancer No          Mammogram Screening - Patient under 40 years of age: Routine Mammogram Screening not recommended.         8/6/2024   STI Screening   New sexual partner(s) since last STI/HIV test? No        History of abnormal  "Pap smear: No - age 21-29 PAP every 3 years recommended        1/12/2023     3:11 PM 12/3/2021    10:26 AM 5/3/2019     1:33 PM   PAP / HPV   PAP Negative for Intraepithelial Lesion or Malignancy (NILM)  Negative for Intraepithelial Lesion or Malignancy (NILM)     PAP (Historical)   NIL            8/6/2024   Contraception/Family Planning   Questions about contraception or family planning No        Reviewed and updated as needed this visit by Provider   Tobacco  Allergies  Meds   Med Hx  Surg Hx  Fam Hx                 Objective    Exam  BP (!) 140/84   Pulse 74   Temp 98  F (36.7  C) (Tympanic)   Resp 18   Ht 1.642 m (5' 4.65\")   Wt 55.8 kg (123 lb)   LMP  (LMP Unknown)   SpO2 98%   BMI 20.69 kg/m     Estimated body mass index is 20.69 kg/m  as calculated from the following:    Height as of this encounter: 1.642 m (5' 4.65\").    Weight as of this encounter: 55.8 kg (123 lb).    Physical Exam  Constitutional:       Appearance: Normal appearance. She is not ill-appearing.   HENT:      Right Ear: Tympanic membrane, ear canal and external ear normal.      Left Ear: Tympanic membrane, ear canal and external ear normal.      Nose: Nose normal. No congestion.      Mouth/Throat:      Mouth: Mucous membranes are moist.      Pharynx: Oropharynx is clear.   Eyes:      Pupils: Pupils are equal, round, and reactive to light.   Cardiovascular:      Rate and Rhythm: Normal rate and regular rhythm.      Pulses: Normal pulses.      Heart sounds: Normal heart sounds. No murmur heard.     No friction rub. No gallop.   Pulmonary:      Effort: Pulmonary effort is normal.      Breath sounds: Normal breath sounds.   Abdominal:      General: Abdomen is flat. Bowel sounds are normal.      Palpations: Abdomen is soft.   Musculoskeletal:         General: Normal range of motion.      Cervical back: Normal range of motion.      Comments: Fungal nail changes on multiple fingers.    Lymphadenopathy:      Cervical: No cervical " adenopathy.   Skin:     General: Skin is warm and dry.   Neurological:      General: No focal deficit present.      Mental Status: She is alert and oriented to person, place, and time.   Psychiatric:         Mood and Affect: Mood normal.         Behavior: Behavior normal.         Thought Content: Thought content normal.         Judgment: Judgment normal.               Signed Electronically by: MIGUEL ANGEL Williamson CNP    Answers submitted by the patient for this visit:  Patient Health Questionnaire (Submitted on 8/6/2024)  If you checked off any problems, how difficult have these problems made it for you to do your work, take care of things at home, or get along with other people?: Somewhat difficult  PHQ9 TOTAL SCORE: 1

## 2024-08-06 NOTE — PATIENT INSTRUCTIONS
Patient Education   Preventive Care Advice   This is general advice given by our system to help you stay healthy. However, your care team may have specific advice just for you. Please talk to your care team about your preventive care needs.  Nutrition  Eat 5 or more servings of fruits and vegetables each day.  Try wheat bread, brown rice and whole grain pasta (instead of white bread, rice, and pasta).  Get enough calcium and vitamin D. Check the label on foods and aim for 100% of the RDA (recommended daily allowance).  Lifestyle  Exercise at least 150 minutes each week  (30 minutes a day, 5 days a week).  Do muscle strengthening activities 2 days a week. These help control your weight and prevent disease.  No smoking.  Wear sunscreen to prevent skin cancer.  Have a dental exam and cleaning every 6 months.  Yearly exams  See your health care team every year to talk about:  Any changes in your health.  Any medicines your care team has prescribed.  Preventive care, family planning, and ways to prevent chronic diseases.  Shots (vaccines)   HPV shots (up to age 26), if you've never had them before.  Hepatitis B shots (up to age 59), if you've never had them before.  COVID-19 shot: Get this shot when it's due.  Flu shot: Get a flu shot every year.  Tetanus shot: Get a tetanus shot every 10 years.  Pneumococcal, hepatitis A, and RSV shots: Ask your care team if you need these based on your risk.  Shingles shot (for age 50 and up)  General health tests  Diabetes screening:  Starting at age 35, Get screened for diabetes at least every 3 years.  If you are younger than age 35, ask your care team if you should be screened for diabetes.  Cholesterol test: At age 39, start having a cholesterol test every 5 years, or more often if advised.  Bone density scan (DEXA): At age 50, ask your care team if you should have this scan for osteoporosis (brittle bones).  Hepatitis C: Get tested at least once in your life.  STIs (sexually  transmitted infections)  Before age 24: Ask your care team if you should be screened for STIs.  After age 24: Get screened for STIs if you're at risk. You are at risk for STIs (including HIV) if:  You are sexually active with more than one person.  You don't use condoms every time.  You or a partner was diagnosed with a sexually transmitted infection.  If you are at risk for HIV, ask about PrEP medicine to prevent HIV.  Get tested for HIV at least once in your life, whether you are at risk for HIV or not.  Cancer screening tests  Cervical cancer screening: If you have a cervix, begin getting regular cervical cancer screening tests starting at age 21.  Breast cancer scan (mammogram): If you've ever had breasts, begin having regular mammograms starting at age 40. This is a scan to check for breast cancer.  Colon cancer screening: It is important to start screening for colon cancer at age 45.  Have a colonoscopy test every 10 years (or more often if you're at risk) Or, ask your provider about stool tests like a FIT test every year or Cologuard test every 3 years.  To learn more about your testing options, visit:   .  For help making a decision, visit:   https://bit.ly/yn72425.  Prostate cancer screening test: If you have a prostate, ask your care team if a prostate cancer screening test (PSA) at age 55 is right for you.  Lung cancer screening: If you are a current or former smoker ages 50 to 80, ask your care team if ongoing lung cancer screenings are right for you.  For informational purposes only. Not to replace the advice of your health care provider. Copyright   2023 [employer] [place] [employer]. All rights reserved. Clinically reviewed by the [facility] Transitions Program. [employer] [id number] - REV 01/24.

## 2024-08-07 ENCOUNTER — TELEPHONE (OUTPATIENT)
Dept: FAMILY MEDICINE | Facility: CLINIC | Age: 24
End: 2024-08-07
Payer: COMMERCIAL

## 2024-08-07 DIAGNOSIS — B35.1 ONYCHOMYCOSIS: Primary | ICD-10-CM

## 2024-08-07 LAB
ALBUMIN SERPL BCG-MCNC: 4.8 G/DL (ref 3.5–5.2)
ALP SERPL-CCNC: 45 U/L (ref 40–150)
ALT SERPL W P-5'-P-CCNC: 46 U/L (ref 0–50)
ANION GAP SERPL CALCULATED.3IONS-SCNC: 12 MMOL/L (ref 7–15)
AST SERPL W P-5'-P-CCNC: 44 U/L (ref 0–45)
BILIRUB SERPL-MCNC: 0.9 MG/DL
BUN SERPL-MCNC: 8.4 MG/DL (ref 6–20)
CALCIUM SERPL-MCNC: 9.2 MG/DL (ref 8.8–10.4)
CHLORIDE SERPL-SCNC: 101 MMOL/L (ref 98–107)
CREAT SERPL-MCNC: 0.64 MG/DL (ref 0.51–0.95)
EGFRCR SERPLBLD CKD-EPI 2021: >90 ML/MIN/1.73M2
FERRITIN SERPL-MCNC: 137 NG/ML (ref 6–175)
GLUCOSE SERPL-MCNC: 84 MG/DL (ref 70–99)
HCO3 SERPL-SCNC: 25 MMOL/L (ref 22–29)
POTASSIUM SERPL-SCNC: 3.8 MMOL/L (ref 3.4–5.3)
PROT SERPL-MCNC: 7.3 G/DL (ref 6.4–8.3)
SODIUM SERPL-SCNC: 138 MMOL/L (ref 135–145)

## 2024-08-07 RX ORDER — TERBINAFINE HYDROCHLORIDE 250 MG/1
250 TABLET ORAL DAILY
Qty: 42 TABLET | Refills: 0 | Status: SHIPPED | OUTPATIENT
Start: 2024-08-07 | End: 2024-09-18

## 2024-08-07 RX ORDER — CICLOPIROX 80 MG/ML
SOLUTION TOPICAL
Qty: 6.6 ML | Refills: 11 | Status: SHIPPED | OUTPATIENT
Start: 2024-08-07

## 2024-08-07 NOTE — TELEPHONE ENCOUNTER
Patient was seen in office yesterday and prescribed oral Terbinafine 250 mg tablet for nail fungal infection.    Provider:   1) Patient is asking if she can be given a topical treatment instead? Asking if she can start with this first?    Patient noted she is a little concerned about the effects of taking the oral tablet and what they can have on the liver and would prefer a topical treatment instead.  Send Rx to [facility] in [place].      Routing to provider to review and advise.      Sydney Snyder RN  Clinical Triage/Primary Care  [facility]

## 2024-08-07 NOTE — TELEPHONE ENCOUNTER
1. Onychomycosis  -new prescription for topical treatment sent  - ciclopirox (PENLAC) 8 % external solution; Apply to adjacent skin and affected nails daily.  Remove with alcohol every 7 days, then repeat.  Dispense: 6.6 mL; Refill: 11

## 2024-08-07 NOTE — TELEPHONE ENCOUNTER
Called patient and read providers note. Patient understands message and verbalizes good understanding of plan of care.    Patient wanted to know if laser treatment therapy is used to treat nail fungus. Patient found it would need a referral to podiatry. Research per patient also suggests they do not perform laser treatment above the ankles.     Recommended patient try topical ointment and let care team know if the nail infection does not clear.    Nayeli Hobbs RN

## 2024-09-03 LAB — BACTERIA SPEC CULT: ABNORMAL

## 2025-06-02 ENCOUNTER — OFFICE VISIT (OUTPATIENT)
Dept: OBGYN | Facility: CLINIC | Age: 25
End: 2025-06-02
Payer: COMMERCIAL

## 2025-06-02 VITALS — DIASTOLIC BLOOD PRESSURE: 76 MMHG | WEIGHT: 124.8 LBS | BODY MASS INDEX: 20.99 KG/M2 | SYSTOLIC BLOOD PRESSURE: 132 MMHG

## 2025-06-02 DIAGNOSIS — Z11.59 NEED FOR HEPATITIS C SCREENING TEST: ICD-10-CM

## 2025-06-02 DIAGNOSIS — Z13.228 SCREENING FOR METABOLIC DISORDER: ICD-10-CM

## 2025-06-02 DIAGNOSIS — Z31.69 ENCOUNTER FOR PRECONCEPTION CONSULTATION: Primary | ICD-10-CM

## 2025-06-02 DIAGNOSIS — Z13.0 SCREENING FOR DISORDER OF BLOOD AND BLOOD-FORMING ORGANS: ICD-10-CM

## 2025-06-02 DIAGNOSIS — Z13.29 SCREENING FOR THYROID DISORDER: ICD-10-CM

## 2025-06-02 DIAGNOSIS — Z13.220 ENCOUNTER FOR LIPID SCREENING FOR CARDIOVASCULAR DISEASE: ICD-10-CM

## 2025-06-02 DIAGNOSIS — F33.2 SEVERE EPISODE OF RECURRENT MAJOR DEPRESSIVE DISORDER, WITHOUT PSYCHOTIC FEATURES (H): ICD-10-CM

## 2025-06-02 DIAGNOSIS — Z13.6 ENCOUNTER FOR LIPID SCREENING FOR CARDIOVASCULAR DISEASE: ICD-10-CM

## 2025-06-02 PROCEDURE — 3075F SYST BP GE 130 - 139MM HG: CPT | Performed by: NURSE PRACTITIONER

## 2025-06-02 PROCEDURE — 99213 OFFICE O/P EST LOW 20 MIN: CPT | Performed by: NURSE PRACTITIONER

## 2025-06-02 PROCEDURE — 3078F DIAST BP <80 MM HG: CPT | Performed by: NURSE PRACTITIONER

## 2025-06-02 RX ORDER — SERTRALINE HYDROCHLORIDE 100 MG/1
TABLET, FILM COATED ORAL
COMMUNITY
Start: 2025-02-28

## 2025-06-02 NOTE — PROGRESS NOTES
"  SUBJECTIVE:                                                   Mina Chavarria is a 25 year old female who presents to clinic today for the following health issue(s):  Patient presents with:  Consult      HPI:  Pt here today to discuss preconception.     She has never been pregnant.   She has a Mirena IUD in place and is starting to have some spotting on the device.  She sees psychiatry at [facility] and is on a few different medications and is doing well.  She has questions about seeing on her mental health medication during pregnancy.  She is requesting fasting labs to be sure that she is \"healthy for pregnancy\".  She is open to seeing a therapist and would like a referral.  Her , Jordan, is in the [employer] and does use finasteride.  He will be having his own physical in the next couple of months.    No LMP recorded. (Menstrual status: IUD)..     Patient is sexually active, .  Using IUD for contraception.    reports that she has quit smoking. She has never used smokeless tobacco.    STD testing offered?  Declined    Health maintenance updated:  yes    Today's PHQ-2 Score:       2019     1:40 PM   PHQ-2 (  [employer])   Q1: Little interest or pleasure in doing things 0   Q2: Feeling down, depressed or hopeless 0   PHQ-2 Score 0   PHQ-2 Total Score (12-17 Years)- Positive if 3 or more points; Administer PHQ-A if positive 0     Today's PHQ-9 Score:       2024     3:06 PM   PHQ-9 SCORE   PHQ-9 Total Score MyChart 1 (Minimal depression)   PHQ-9 Total Score 1     Today's JAMILA-7 Score:       2023     2:49 PM   JAMILA-7 SCORE   Total Score 1       Problem list and histories reviewed & adjusted, as indicated.  Additional history: as documented.    Patient Active Problem List   Diagnosis    Herpes simplex    Major depressive disorder, recurrent episode, severe (H)    Anxiety disorder    IUD check up    Constipation    Recurrent UTI    Attention deficit disorder (ADD) without hyperactivity    H/O " self-harm     Past Surgical History:   Procedure Laterality Date    ZZHC EXC BENIGN SKIN LESION FACE/EARS <=0.5 CM  infant      Social History     Tobacco Use    Smoking status: Former    Smokeless tobacco: Never    Tobacco comments:     smoked rarely   Substance Use Topics    Alcohol use: Yes     Alcohol/week: 0.0 standard drinks of alcohol      Problem (# of Occurrences) Relation (Name,Age of Onset)    Cancer (1) Maternal Grandmother: rectal cancer    Gynecology (1) Mother: endometrial hyperplasia, s/p hyterectomy    No Known Problems (6) Father, Sister, Brother, Maternal Grandfather, Paternal Grandmother, Other              Current Outpatient Medications   Medication Sig Dispense Refill    buPROPion (WELLBUTRIN XL) 300 MG 24 hr tablet Take 1 tablet (300 mg) by mouth every morning 30 tablet 2    busPIRone (BUSPAR) 10 MG tablet TAKE 2 TABS BY MOUTH TWICE DAILY      ciclopirox (PENLAC) 8 % external solution Apply to adjacent skin and affected nails daily.  Remove with alcohol every 7 days, then repeat. 6.6 mL 11    levonorgestrel (MIRENA) 52 MG (20 mcg/day) IUD by Intrauterine route once Placed 2021      sertraline (ZOLOFT) 100 MG tablet TAKE 0.5 TAB BY MOUTH X 14 DAYS THEN 1 TAB X 14 DAYS THEN 1.5 TABS X 14 DAYS THEN 2 TABS DAILY       No current facility-administered medications for this visit.     Allergies   Allergen Reactions    Hydrocodone Nausea and Vomiting and Nausea    Oxycodone Nausea and Vomiting and Nausea       ROS:  12 point review of systems negative other than symptoms noted below or in the HPI.  No urinary frequency or dysuria, bladder or kidney problems      OBJECTIVE:     /76   Wt 56.6 kg (124 lb 12.8 oz)   BMI 20.99 kg/m    Body mass index is 20.99 kg/m .    Exam:  Constitutional:  Appearance: Well nourished, well developed alert, in no acute distress  Neurologic:  Mental Status:  Oriented X3.  Normal strength and tone, sensory exam grossly normal, mentation intact and speech normal.     Psychiatric:  Mentation appears normal and affect normal/bright.     In-Clinic Test Results:  No results found for this or any previous visit (from the past 24 hours).    ASSESSMENT/PLAN:                                                        ICD-10-CM    1. Encounter for preconception consultation  Z31.69       2. Severe episode of recurrent major depressive disorder, without psychotic features (H)  F33.2       3. Need for hepatitis C screening test  Z11.59 Hepatitis C Screen Reflex to HCV RNA Quant and Genotype      4. Screening for thyroid disorder  Z13.29 TSH with free T4 reflex      5. Screening for metabolic disorder  Z13.228 Comprehensive metabolic panel     Hemoglobin A1c      6. Encounter for lipid screening for cardiovascular disease  Z13.220 Lipid Profile    Z13.6       7. Screening for disorder of blood and blood-forming organs  Z13.0 CBC with platelets          There are no Patient Instructions on file for this visit.    25-year-old female here for preconception consultation.  We recommended that she complete her fasting lab work and she will do this at a later date.  When she is ready to remove her IUD she is to call.  Her last physical was in August 2024.  We discussed removing her IUD at the time of her annual exam which I am okay with.  We have given her written referral for a therapist.    MIGUEL ANGEL Oneal CNP  [facility] [place]

## 2025-06-04 ENCOUNTER — MYC MEDICAL ADVICE (OUTPATIENT)
Dept: OBGYN | Facility: CLINIC | Age: 25
End: 2025-06-04
Payer: COMMERCIAL

## 2025-06-28 ENCOUNTER — LAB (OUTPATIENT)
Dept: LAB | Facility: CLINIC | Age: 25
End: 2025-06-28
Payer: COMMERCIAL

## 2025-06-28 DIAGNOSIS — Z13.6 ENCOUNTER FOR LIPID SCREENING FOR CARDIOVASCULAR DISEASE: ICD-10-CM

## 2025-06-28 DIAGNOSIS — Z13.228 SCREENING FOR METABOLIC DISORDER: ICD-10-CM

## 2025-06-28 DIAGNOSIS — Z13.0 SCREENING FOR DISORDER OF BLOOD AND BLOOD-FORMING ORGANS: ICD-10-CM

## 2025-06-28 DIAGNOSIS — Z13.220 ENCOUNTER FOR LIPID SCREENING FOR CARDIOVASCULAR DISEASE: ICD-10-CM

## 2025-06-28 DIAGNOSIS — Z11.59 NEED FOR HEPATITIS C SCREENING TEST: ICD-10-CM

## 2025-06-28 DIAGNOSIS — Z13.29 SCREENING FOR THYROID DISORDER: ICD-10-CM

## 2025-06-28 LAB
ALBUMIN SERPL BCG-MCNC: 4.9 G/DL (ref 3.5–5.2)
ALP SERPL-CCNC: 53 U/L (ref 40–150)
ALT SERPL W P-5'-P-CCNC: 28 U/L (ref 0–50)
ANION GAP SERPL CALCULATED.3IONS-SCNC: 12 MMOL/L (ref 7–15)
AST SERPL W P-5'-P-CCNC: 34 U/L (ref 0–45)
BILIRUB SERPL-MCNC: 0.5 MG/DL
BUN SERPL-MCNC: 14.9 MG/DL (ref 6–20)
CALCIUM SERPL-MCNC: 9.7 MG/DL (ref 8.8–10.4)
CHLORIDE SERPL-SCNC: 102 MMOL/L (ref 98–107)
CHOLEST SERPL-MCNC: 257 MG/DL
CREAT SERPL-MCNC: 0.76 MG/DL (ref 0.51–0.95)
EGFRCR SERPLBLD CKD-EPI 2021: >90 ML/MIN/1.73M2
ERYTHROCYTE [DISTWIDTH] IN BLOOD BY AUTOMATED COUNT: 11.6 % (ref 10–15)
EST. AVERAGE GLUCOSE BLD GHB EST-MCNC: 80 MG/DL
FASTING STATUS PATIENT QL REPORTED: YES
FASTING STATUS PATIENT QL REPORTED: YES
GLUCOSE SERPL-MCNC: 82 MG/DL (ref 70–99)
HBA1C MFR BLD: 4.4 % (ref 0–5.6)
HCO3 SERPL-SCNC: 23 MMOL/L (ref 22–29)
HCT VFR BLD AUTO: 40.7 % (ref 35–47)
HCV AB SERPL QL IA: NONREACTIVE
HDLC SERPL-MCNC: 106 MG/DL
HGB BLD-MCNC: 13.5 G/DL (ref 11.7–15.7)
LDLC SERPL CALC-MCNC: 129 MG/DL
MCH RBC QN AUTO: 32.1 PG (ref 26.5–33)
MCHC RBC AUTO-ENTMCNC: 33.2 G/DL (ref 31.5–36.5)
MCV RBC AUTO: 97 FL (ref 78–100)
NONHDLC SERPL-MCNC: 151 MG/DL
PLATELET # BLD AUTO: 306 10E3/UL (ref 150–450)
POTASSIUM SERPL-SCNC: 4.4 MMOL/L (ref 3.4–5.3)
PROT SERPL-MCNC: 7.8 G/DL (ref 6.4–8.3)
RBC # BLD AUTO: 4.2 10E6/UL (ref 3.8–5.2)
SODIUM SERPL-SCNC: 137 MMOL/L (ref 135–145)
TRIGL SERPL-MCNC: 109 MG/DL
TSH SERPL DL<=0.005 MIU/L-ACNC: 1.42 UIU/ML (ref 0.3–4.2)
WBC # BLD AUTO: 4.7 10E3/UL (ref 4–11)

## 2025-06-28 PROCEDURE — 85027 COMPLETE CBC AUTOMATED: CPT

## 2025-06-28 PROCEDURE — 80053 COMPREHEN METABOLIC PANEL: CPT

## 2025-06-28 PROCEDURE — 36415 COLL VENOUS BLD VENIPUNCTURE: CPT

## 2025-06-28 PROCEDURE — 84443 ASSAY THYROID STIM HORMONE: CPT

## 2025-06-28 PROCEDURE — 83036 HEMOGLOBIN GLYCOSYLATED A1C: CPT

## 2025-06-28 PROCEDURE — 86803 HEPATITIS C AB TEST: CPT

## 2025-06-28 PROCEDURE — 80061 LIPID PANEL: CPT

## 2025-06-30 ENCOUNTER — RESULTS FOLLOW-UP (OUTPATIENT)
Dept: OBGYN | Facility: CLINIC | Age: 25
End: 2025-06-30

## 2025-08-07 ENCOUNTER — OFFICE VISIT (OUTPATIENT)
Dept: OBGYN | Facility: CLINIC | Age: 25
End: 2025-08-07
Payer: COMMERCIAL

## 2025-08-07 VITALS
WEIGHT: 123 LBS | SYSTOLIC BLOOD PRESSURE: 114 MMHG | HEIGHT: 64 IN | BODY MASS INDEX: 21 KG/M2 | DIASTOLIC BLOOD PRESSURE: 64 MMHG

## 2025-08-07 DIAGNOSIS — Z01.419 ENCOUNTER FOR GYNECOLOGICAL EXAMINATION WITHOUT ABNORMAL FINDING: Primary | ICD-10-CM

## 2025-08-07 DIAGNOSIS — Z30.432 ENCOUNTER FOR REMOVAL OF INTRAUTERINE CONTRACEPTIVE DEVICE: ICD-10-CM

## 2025-08-07 DIAGNOSIS — Z31.69 ENCOUNTER FOR PRECONCEPTION CONSULTATION: ICD-10-CM

## 2025-08-13 LAB
BKR LAB AP GYN ADEQUACY: NORMAL
BKR LAB AP GYN INTERPRETATION: NORMAL
BKR LAB AP HPV REFLEX: NORMAL
BKR LAB AP PREVIOUS ABNORMAL: NORMAL
PATH REPORT.COMMENTS IMP SPEC: NORMAL
PATH REPORT.COMMENTS IMP SPEC: NORMAL
PATH REPORT.RELEVANT HX SPEC: NORMAL